# Patient Record
Sex: FEMALE | Race: WHITE | NOT HISPANIC OR LATINO | Employment: OTHER | URBAN - METROPOLITAN AREA
[De-identification: names, ages, dates, MRNs, and addresses within clinical notes are randomized per-mention and may not be internally consistent; named-entity substitution may affect disease eponyms.]

---

## 2017-01-04 ENCOUNTER — TRANSFERRED RECORDS (OUTPATIENT)
Dept: HEALTH INFORMATION MANAGEMENT | Facility: CLINIC | Age: 67
End: 2017-01-04

## 2017-03-17 DIAGNOSIS — E55.9 VITAMIN D DEFICIENCY: ICD-10-CM

## 2017-03-17 NOTE — TELEPHONE ENCOUNTER
Approved per pharmacist refill protocol. Patient is current on office visit and labs. Thanks.    Asia Davila, Pharm.D  Pharmacist in Charge  [facility]

## 2017-03-17 NOTE — TELEPHONE ENCOUNTER
Vitamin D3 1000unit      Last Written Prescription Date: 5-27-16  Last Fill Quantity: 90,  # refills: 1   Last Office Visit with G, [facility] or [facility] prescribing provider: 10-3-2016

## 2017-06-20 DIAGNOSIS — E55.9 VITAMIN D DEFICIENCY: ICD-10-CM

## 2017-06-20 NOTE — TELEPHONE ENCOUNTER
cholecalciferol (VITAMIN D) 1000 UNIT tablet      Last Written Prescription Date: 3/17/17  Last Fill Quantity: 90,  # refills: 0   Last Office Visit with [facility], [facility] or [facility] prescribing provider: 10/31/16

## 2017-06-23 RX ORDER — CHOLECALCIFEROL (VITAMIN D3) 25 MCG
TABLET ORAL
Qty: 90 TABLET | Refills: 1 | Status: SHIPPED | OUTPATIENT
Start: 2017-06-23 | End: 2018-01-15

## 2017-06-23 NOTE — TELEPHONE ENCOUNTER
Prescription approved per [facility] Refill Protocol.  Due for an annual hypertension check in July.   Please call patient to schedule this.   Cindy Fung, LINDSAY  Triage Nurse

## 2017-07-17 ENCOUNTER — OFFICE VISIT (OUTPATIENT)
Dept: FAMILY MEDICINE | Facility: CLINIC | Age: 67
End: 2017-07-17
Payer: COMMERCIAL

## 2017-07-17 VITALS
OXYGEN SATURATION: 98 % | BODY MASS INDEX: 42.27 KG/M2 | RESPIRATION RATE: 18 BRPM | HEIGHT: 65 IN | HEART RATE: 65 BPM | DIASTOLIC BLOOD PRESSURE: 62 MMHG | WEIGHT: 253.7 LBS | TEMPERATURE: 98.4 F | SYSTOLIC BLOOD PRESSURE: 130 MMHG

## 2017-07-17 DIAGNOSIS — Z11.59 ENCOUNTER FOR HEPATITIS C SCREENING TEST FOR LOW RISK PATIENT: ICD-10-CM

## 2017-07-17 DIAGNOSIS — K51.80 OTHER ULCERATIVE COLITIS WITHOUT COMPLICATION (H): ICD-10-CM

## 2017-07-17 DIAGNOSIS — E66.01 MORBID OBESITY, UNSPECIFIED OBESITY TYPE (H): ICD-10-CM

## 2017-07-17 DIAGNOSIS — Z71.89 ADVANCED DIRECTIVES, COUNSELING/DISCUSSION: ICD-10-CM

## 2017-07-17 DIAGNOSIS — Z91.89 PNEUMOCOCCAL VACCINATION INDICATED: ICD-10-CM

## 2017-07-17 DIAGNOSIS — Z79.890 POSTMENOPAUSAL HRT (HORMONE REPLACEMENT THERAPY): ICD-10-CM

## 2017-07-17 DIAGNOSIS — I10 ESSENTIAL HYPERTENSION WITH GOAL BLOOD PRESSURE LESS THAN 140/90: ICD-10-CM

## 2017-07-17 DIAGNOSIS — E78.5 HYPERLIPIDEMIA LDL GOAL <160: ICD-10-CM

## 2017-07-17 DIAGNOSIS — L83: ICD-10-CM

## 2017-07-17 DIAGNOSIS — Z23 NEED FOR TDAP VACCINATION: ICD-10-CM

## 2017-07-17 DIAGNOSIS — Z00.00 ROUTINE GENERAL MEDICAL EXAMINATION AT A HEALTH CARE FACILITY: Primary | ICD-10-CM

## 2017-07-17 PROCEDURE — 80061 LIPID PANEL: CPT | Performed by: INTERNAL MEDICINE

## 2017-07-17 PROCEDURE — 82043 UR ALBUMIN QUANTITATIVE: CPT | Performed by: INTERNAL MEDICINE

## 2017-07-17 PROCEDURE — 80053 COMPREHEN METABOLIC PANEL: CPT | Performed by: INTERNAL MEDICINE

## 2017-07-17 PROCEDURE — 90471 IMMUNIZATION ADMIN: CPT | Performed by: INTERNAL MEDICINE

## 2017-07-17 PROCEDURE — 86803 HEPATITIS C AB TEST: CPT | Performed by: INTERNAL MEDICINE

## 2017-07-17 PROCEDURE — G0009 ADMIN PNEUMOCOCCAL VACCINE: HCPCS | Mod: 59 | Performed by: INTERNAL MEDICINE

## 2017-07-17 PROCEDURE — 99213 OFFICE O/P EST LOW 20 MIN: CPT | Mod: 25 | Performed by: INTERNAL MEDICINE

## 2017-07-17 PROCEDURE — 90732 PPSV23 VACC 2 YRS+ SUBQ/IM: CPT | Performed by: INTERNAL MEDICINE

## 2017-07-17 PROCEDURE — 36415 COLL VENOUS BLD VENIPUNCTURE: CPT | Performed by: INTERNAL MEDICINE

## 2017-07-17 PROCEDURE — 99397 PER PM REEVAL EST PAT 65+ YR: CPT | Mod: 25 | Performed by: INTERNAL MEDICINE

## 2017-07-17 PROCEDURE — 90715 TDAP VACCINE 7 YRS/> IM: CPT | Performed by: INTERNAL MEDICINE

## 2017-07-17 RX ORDER — VALSARTAN AND HYDROCHLOROTHIAZIDE 160; 12.5 MG/1; MG/1
1 TABLET, FILM COATED ORAL DAILY
Qty: 90 TABLET | Refills: 3 | Status: SHIPPED | OUTPATIENT
Start: 2017-07-17 | End: 2018-07-31 | Stop reason: ALTCHOICE

## 2017-07-17 NOTE — MR AVS SNAPSHOT
After Visit Summary   7/17/2017    Jaylyn Gonzalez    MRN: [id number]           Patient Information     Date Of Birth          1950        Visit Information        Provider Department      7/17/2017 10:30 AM Luz Elena Paul MD [facility]        Today's Diagnoses     Routine general medical examination at a health care facility    -  1    Essential hypertension with goal blood pressure less than 140/90        Morbid obesity, unspecified obesity type (H)        Keratosis nigricans        Advanced directives, counseling/discussion        Other ulcerative colitis without complication (H)        Pneumococcal vaccination indicated        Need for Tdap vaccination        Encounter for hepatitis C screening test for low risk patient        Hyperlipidemia LDL goal <160          Care Instructions      Preventive Health Recommendations    Female Ages 65 +    Yearly exam:     See your health care provider every year in order to  o Review health changes.   o Discuss preventive care.    o Review your medicines if your doctor has prescribed any.      You no longer need a yearly Pap test unless you've had an abnormal Pap test in the past 10 years. If you have vaginal symptoms, such as bleeding or discharge, be sure to talk with your provider about a Pap test.      Every 1 to 2 years, have a mammogram.  If you are over 69, talk with your health care provider about whether or not you want to continue having screening mammograms.      Every 10 years, have a colonoscopy. Or, have a yearly FIT test (stool test). These exams will check for colon cancer.       Have a cholesterol test every 5 years, or more often if your doctor advises it.       Have a diabetes test (fasting glucose) every three years. If you are at risk for diabetes, you should have this test more often.       At age 65, have a bone density scan (DEXA) to check for osteoporosis (brittle bone disease).    Shots:    Get a flu shot  each year.    Get a tetanus shot every 10 years.    Talk to your doctor about your pneumonia vaccines. There are now two you should receive - Pneumovax (PPSV 23) and Prevnar (PCV 13).    Talk to your doctor about the shingles vaccine.    Talk to your doctor about the hepatitis B vaccine.    Nutrition:     Eat at least 5 servings of fruits and vegetables each day.      Eat whole-grain bread, whole-wheat pasta and brown rice instead of white grains and rice.      Talk to your provider about Calcium and Vitamin D.     Lifestyle    Exercise at least 150 minutes a week (30 minutes a day, 5 days a week). This will help you control your weight and prevent disease.      Limit alcohol to one drink per day.      No smoking.       Wear sunscreen to prevent skin cancer.       See your dentist twice a year for an exam and cleaning.      See your eye doctor every 1 to 2 years to screen for conditions such as glaucoma, macular degeneration and cataracts.          Follow-ups after your visit        Additional Services     DERMATOLOGY REFERRAL       Your provider has referred you to: [facility] [phone]   [email or web address]    Please be aware that coverage of these services is subject to the terms and limitations of your health insurance plan.  Call member services at your health plan with any benefit or coverage questions.      Please bring the following with you to your appointment:    (1) Any X-Rays, CTs or MRIs which have been performed.  Contact the facility where they were done to arrange for  prior to your scheduled appointment.    (2) List of current medications  (3) This referral request   (4) Any documents/labs given to you for this referral                  Who to contact     If you have questions or need follow up information about today's clinic visit or your schedule please contact [facility] directly at [phone].  Normal or non-critical lab and  "imaging results will be communicated to you by MyChart, letter or phone within 4 business days after the clinic has received the results. If you do not hear from us within 7 days, please contact the clinic through [employer] or phone. If you have a critical or abnormal lab result, we will notify you by phone as soon as possible.  Submit refill requests through [employer] or call your pharmacy and they will forward the refill request to us. Please allow 3 business days for your refill to be completed.          Additional Information About Your Visit        [employer] Information     [employer] lets you send messages to your doctor, view your test results, renew your prescriptions, schedule appointments and more. To sign up, go to [email or web address] . Click on \"Log in\" on the left side of the screen, which will take you to the Welcome page. Then click on \"Sign up Now\" on the right side of the page.     You will be asked to enter the access code listed below, as well as some personal information. Please follow the directions to create your username and password.     Your access code is: [email or web address]  Expires: 10/15/2017 11:29 AM     Your access code will  in 90 days. If you need help or a new code, please call your [facility] clinic or [phone].        Care EveryWhere ID     This is your Care EveryWhere ID. This could be used by other organizations to access your [facility] medical records  [id number]        Your Vitals Were     Pulse Temperature Respirations Height Pulse Oximetry BMI (Body Mass Index)    65 98.4  F (36.9  C) (Oral) 18 5' 5\" (1.651 m) 98% 42.22 kg/m2       Blood Pressure from Last 3 Encounters:   17 130/62   10/03/16 126/82   16 132/76    Weight from Last 3 Encounters:   17 253 lb 11.2 oz (115.1 kg)   10/03/16 251 lb 3.2 oz (113.9 kg)   16 250 lb 14.4 oz (113.8 kg)              We Performed the Following     ADMIN MEDICARE: Pneumococcal Vaccine ()     Albumin Random Urine " Quantitative     Comprehensive metabolic panel     DERMATOLOGY REFERRAL     Hepatitis C antibody     Lipid panel reflex to direct LDL     Pneumococcal vaccine 23 valent PPSV23  (Pneumovax) [21451]     TDAP VACCINE (ADACEL)          Today's Medication Changes          These changes are accurate as of: 7/17/17 11:29 AM.  If you have any questions, ask your nurse or doctor.               These medicines have changed or have updated prescriptions.        Dose/Directions    valsartan-hydrochlorothiazide 160-12.5 MG per tablet   Commonly known as:  DIOVAN-HCT   This may have changed:  additional instructions   Used for:  Essential hypertension with goal blood pressure less than 140/90   Changed by:  Luz Elena Paul MD        Dose:  1 tablet   Take 1 tablet by mouth daily   Quantity:  90 tablet   Refills:  3            Where to get your medicines      These medications were sent to [facility] [address]  [address] [facility] [address]     Phone:  [phone]     valsartan-hydrochlorothiazide 160-12.5 MG per tablet                Primary Care Provider Office Phone # Fax #    Luz Elena Paul [phone]       [facility] [address] [facility] [address]        Equal Access to Services     VIN LERMA AH: Hadii aliza ku hadasho Soomaali, waaxda luqadaha, qaybta kaalmada adeegyada, waxreid benitesin haylucrecian carlos gifford. So [facility] [phone].    ATENCIÓN: Si habla español, tiene a york disposición servicios gratuitos de asistencia lingüística. Llame al [phone].    We comply with applicable federal civil rights laws and [place] laws. We do not discriminate on the basis of race, color, national origin, age, disability sex, sexual orientation or gender identity.            Thank you!     Thank you for choosing [facility]  for your care. Our goal is always to provide you with excellent care. Hearing back from  our patients is one way we can continue to improve our services. Please take a few minutes to complete the written survey that you may receive in the mail after your visit with us. Thank you!             Your Updated Medication List - Protect others around you: Learn how to safely use, store and throw away your medicines at www.disposemymeds.org.          This list is accurate as of: 7/17/17 11:29 AM.  Always use your most recent med list.                   Brand Name Dispense Instructions for use Diagnosis    CALCIUM 500 PO      Take  by mouth.        cholecalciferol 1000 UNIT tablet    vitamin D    90 tablet    TAKE ONE TABLET BY MOUTH EVERY DAY    Vitamin D deficiency       mesalamine 400 MG EC tablet    ASACOL    180 tablet    Take 2 tablets (800 mg) by mouth daily as needed , pt usually is taking 1 -  800 mg per day    UC (ulcerative colitis) (H)       valsartan-hydrochlorothiazide 160-12.5 MG per tablet    DIOVAN-HCT    90 tablet    Take 1 tablet by mouth daily    Essential hypertension with goal blood pressure less than 140/90

## 2017-07-17 NOTE — NURSING NOTE
"Chief Complaint   Patient presents with     Physical     pt fasting     Hypertension     Imm/Inj     due for TDAP    PCV 23     Derm Problem       Initial /62 (BP Location: Right arm, Patient Position: Chair, Cuff Size: Adult Large)  Pulse 65  Temp 98.4  F (36.9  C) (Oral)  Resp 18  Ht 5' 5\" (1.651 m)  Wt 253 lb 11.2 oz (115.1 kg)  SpO2 98%  BMI 42.22 kg/m2 Estimated body mass index is 42.22 kg/(m^2) as calculated from the following:    Height as of this encounter: 5' 5\" (1.651 m).    Weight as of this encounter: 253 lb 11.2 oz (115.1 kg).  Medication Reconciliation: complete    "

## 2017-07-17 NOTE — PROGRESS NOTES
SUBJECTIVE:   Jaylyn Gonzalez is a 66 year old female who presents for Preventive Visit.      Are you in the first 12 months of your Medicare Part B coverage?  No    Healthy Habits:  Answers for HPI/ROS submitted by the patient on 7/17/2017   Annual Exam:  Getting at least 3 servings of Calcium per day:: Yes  Bi-annual eye exam:: NO  Dental care twice a year:: Yes  Sleep apnea or symptoms of sleep apnea:: Daytime drowsiness  Frequency of exercise:: 6-7 days/week  Taking medications regularly:: Yes  PHQ-2 Score: 0  Duration of exercise:: 30-45 minutes      COGNITIVE SCREEN  1) Repeat 3 items (Banana, Sunrise, Chair)    2) Clock draw: NORMAL  3) 3 item recall: Recalls 3 objects  Results: 3 items recalled: COGNITIVE IMPAIRMENT LESS LIKELY    Mini-CogTM Copyright S Chel. Licensed by the author for use in [facility] [employer]; reprinted with permission ([email or web address]@[email or web address]). All rights reserved.      Derm concern  Pt has pigmented patches of skin that feel very dry and itchy.  She has had some treated with liquid nitrogen in the past and that has helped.  She has noted one area on her upper left breast and skin tags on her neck which she would like to have checked to ensure that they are okay.    Hypertension  Pt has a history of hypertension and is currently taking valsartan hydrochlorothiazide.  She feels this medication is effective and is not experiencing any side effects.  Patient is not requesting any changes at this time.  Her blood pressure is well maintained and within range.    BP Readings from Last 3 Encounters:   07/17/17 130/62   10/03/16 126/82   07/28/16 132/76     Reviewed and updated as needed this visit by clinical staff  Tobacco  Allergies  Meds  Med Hx  Surg Hx  Fam Hx  Soc Hx      Reviewed and updated as needed this visit by Provider        Social History   Substance Use Topics     Smoking status: Never Smoker     Smokeless tobacco: Never Used     Alcohol use Yes      Comment: occ       The  patient does not drink >3 drinks per day nor >7 drinks per week.    Today's PHQ-2 Score:   PHQ-2 ( 1999 Pfizer) 7/17/2017 7/28/2016   Q1: Little interest or pleasure in doing things 0 0   Q2: Feeling down, depressed or hopeless 0 0   PHQ-2 Score 0 0   Q1: Little interest or pleasure in doing things Not at all -   Q2: Feeling down, depressed or hopeless Not at all -   PHQ-2 Score 0 -     Do you feel safe in your environment - Yes    Do you have a Health Care Directive?: No: Advance care planning was reviewed with patient; patient declined at this time.    Current providers sharing in care for this patient include:   Patient Care Team:  Luz Elena Paul MD as PCP - General (Internal Medicine)      Hearing impairment: No    Ability to successfully perform activities of daily living: Yes, no assistance needed     Fall risk:  Fallen 2 or more times in the past year?: No  Any fall with injury in the past year?: No    Home safety:  none identified      The following health maintenance items are reviewed in Epic and correct as of today:  Health Maintenance   Topic Date Due     HEPATITIS C SCREENING  10/15/1968     PNEUMOCOCCAL (2 of 2 - PPSV23) 12/01/2015     TETANUS IMMUNIZATION (SYSTEM ASSIGNED)  05/01/2017     FALL RISK ASSESSMENT  07/28/2017     MAMMO SCREEN Q2 YR (SYSTEM ASSIGNED)  11/07/2018     DEXA Q3 YR  07/17/2020     LIPID MONITORING Q5 YEARS  07/29/2021     ADVANCE DIRECTIVE PLANNING Q5 YRS  07/17/2022     COLON CANCER SCREEN (SYSTEM ASSIGNED)  04/18/2026     Labs reviewed in [facility]    REVIEW OF SYSTEMS:  C: NEGATIVE for fever, chills, or change in weight  I: Positive for skin tags on neck and area on breast; NEGATIVE for worrisome rashes, moles or lesions  E/M: NEGATIVE for ear, nose, mouth, or throat problems  R: NEGATIVE for cough or shortness of breath  CV: NEGATIVE for chest pain, palpitations or peripheral edema  GI: Positive for soft bloody stool, NEGATIVE for nausea, abdominal pain, heartburn, or  "change in bowel habits  : Hx of fallen bladder - causes urinary frequency; NEGATIVE for unusual vaginal symptoms;   P: NEGATIVE for changes in mood or affect    This document serves as a record of the services and decisions personally performed and made by Luz Elena Paul MD. It was created on her behalf by Manuel Hermosillo, a trained medical scribe. The creation of this document is based the provider's statements to the medical scribe.  Manuel Hermosillo, July 17, 2017 11:18 AM    OBJECTIVE:   /62 (BP Location: Right arm, Patient Position: Chair, Cuff Size: Adult Large)  Pulse 65  Temp 98.4  F (36.9  C) (Oral)  Resp 18  Ht 1.651 m (5' 5\")  Wt 115.1 kg (253 lb 11.2 oz)  SpO2 98%  BMI 42.22 kg/m2 Estimated body mass index is 42.22 kg/(m^2) as calculated from the following:    Height as of this encounter: 1.651 m (5' 5\").    Weight as of this encounter: 115.1 kg (253 lb 11.2 oz).    EXAM:  GENERAL: Patient appears healthy, alert and not distressed.  HENT: Ear canals and TM's appear normal and mouth without ulcers or lesions, oropharynx is clear and oral mucous membranes are moist  NECK: No adenopathy present, no asymmetry, masses, or scars noted, thyroid is normal to palpation  RESP: Lungs are clear to auscultation - no rales, rhonchi or wheezes present  CV: Regular rate and rhythm, normal S1 S2 heart sounds, no ectopy, no peripheral edema present, peripheral pulses normal, no carotid bruit.  ABDOMEN: Soft, nontender, no hepatosplenomegaly, no masses, normal bowel sounds  BREAST: Normal without masses, tenderness or nipple discharge and no palpable axillary masses or adenopathy  MS: No gross musculoskeletal defects noted, no edema, gait is age appropriate without ataxia, tandem walk off - forced to catch herself;   SKIN: Keratoses in hairline and on face; No suspicious rashes, lesions, or moles.  NEURO: Patient exhibits normal strength and tone, normal speech and mentation  PSYCH: Mentation appears normal, " affect is normal/bright    Diagnostic Test Results:  No results found for this or any previous visit (from the past 24 hour(s)).     ASSESSMENT / PLAN:   (Z00.00) Routine general medical examination at a health care facility  (primary encounter diagnosis)  Comment: Pt was due for a routine physical  Plan: reassess in one year    (I10) Essential hypertension with goal blood pressure less than 140/90  Comment: Pt has a history of hypertension and is currently taking valsartan-hydrochlorothiazide.  meds are well tolerated; diuretic monitoring- Cr and Potassium  Plan: valsartan-hydrochlorothiazide (DIOVAN-HCT)         160-12.5 MG per tablet, Comprehensive metabolic        panel, Lipid panel reflex to direct LDL,         Albumin Random Urine Quantitative          (E66.01) Morbid obesity, unspecified obesity type (H)  Comment: Pt has a BMI 42.31, is morbidly obese.  Plan: encourage diet and exercise     (L83) Keratosis nigricans  Comment: multiple- face, scalp; interested in treatment ; to see Dermatologist.  Plan: DERMATOLOGY REFERRAL          (Z71.89) Advanced directives, counseling/discussion  Comment: Pt does not have an advanced directive on file, was given instructions on how to set it up  Plan: Pt will set up an advanced directive and get back to the clinic     (K51.80) Other ulcerative colitis without complication (H)  Comment: Followed by [facility]- On Asacol  Plan: Continue to follow [facility]; currently well contorlled    (Z91.89) Pneumococcal vaccination indicated  Comment: Pt due for a pneumococcal vaccine  Plan: Pneumococcal vaccine 23 valent PPSV23          (Pneumovax) [65026], ADMIN MEDICARE:         Pneumococcal Vaccine ()          (Z23) Need for Tdap vaccination  Comment: Pt due for a TDAP vaccination  Plan: TDAP VACCINE (ADACEL)          (Z11.59) Encounter for hepatitis C screening test for low risk patient  Comment: The patient was born between 1946 and 1966,Hep C screening is recommended .   Plan:  "Hepatitis C antibody          (E78.5) Hyperlipidemia LDL goal <160  Comment: Pt has a history of hyperlipidemia and is currently taking no statin.  Her cholesterol is elevated but prefers to manage with diet and exercise.    Recent Labs   Lab Test  07/29/16   0908  12/01/14   1211  11/04/13   1234   CHOL  250*  260*  219*   HDL  55  76  61   LDL  162*  168*  141*   TRIG  165*  79  85   CHOLHDLRATIO   --   3.4  3.6   Plan: Comprehensive metabolic panel, Lipid panel         reflex to direct LDL, continue to monitor diet and exercise          End of Life Planning:  Patient currently has an advanced directive: No.  I have verified the patient's ablity to prepare an advanced directive/make health care decisions.  Literature was provided to assist patient in preparing an advanced directive.    COUNSELING:  Reviewed preventive health counseling, as reflected in patient instructions  Special attention given to:       Regular exercise       Healthy diet/nutrition    Estimated body mass index is 42.22 kg/(m^2) as calculated from the following:    Height as of this encounter: 1.651 m (5' 5\").    Weight as of this encounter: 115.1 kg (253 lb 11.2 oz).  Weight management plan: Discussed healthy diet and exercise guidelines and patient will follow up in 12 months in clinic to re-evaluate.   reports that she has never smoked. She has never used smokeless tobacco.      Appropriate preventive services were discussed with this patient, including applicable screening as appropriate for cardiovascular disease, diabetes, osteopenia/osteoporosis, and glaucoma.  As appropriate for age/gender, discussed screening for colorectal cancer, prostate cancer, breast cancer, and cervical cancer. Checklist reviewing preventive services available has been given to the patient.    Reviewed patients plan of care and provided an AVS. The Basic Care Plan (routine screening as documented in Health Maintenance) for Jaylyn meets the Care Plan requirement. " This Care Plan has been established and reviewed with the Patient.    Counseling Resources:  ATP IV Guidelines  Pooled Cohorts Equation Calculator  Breast Cancer Risk Calculator  FRAX Risk Assessment  ICSI Preventive Guidelines  Dietary Guidelines for Americans, 2010  USDA's MyPlate  ASA Prophylaxis  Lung CA Screening    The information in this document, created by a medical scribe for me, accurately reflects the services I personally performed and the decisions made by me. I have reviewed and approved this document for accuracy.  Dr. Luz Elena Paul, July 17, 2017, 11:40 PM    Luz Elena Paul MD  Internal Medicine  [facility] [place]  15 minutes in addition to HEALTH CARE MAINTENANCE are spent with patient, over 50% of that time spent providing counselling, discussing and reviewing medical conditions/concerns, meds and potential side effects.

## 2017-07-18 LAB
ALBUMIN SERPL-MCNC: 3.4 G/DL (ref 3.4–5)
ALP SERPL-CCNC: 75 U/L (ref 40–150)
ALT SERPL W P-5'-P-CCNC: 18 U/L (ref 0–50)
ANION GAP SERPL CALCULATED.3IONS-SCNC: 8 MMOL/L (ref 3–14)
AST SERPL W P-5'-P-CCNC: 14 U/L (ref 0–45)
BILIRUB SERPL-MCNC: 0.7 MG/DL (ref 0.2–1.3)
BUN SERPL-MCNC: 13 MG/DL (ref 7–30)
CALCIUM SERPL-MCNC: 9.1 MG/DL (ref 8.5–10.1)
CHLORIDE SERPL-SCNC: 107 MMOL/L (ref 94–109)
CHOLEST SERPL-MCNC: 241 MG/DL
CO2 SERPL-SCNC: 28 MMOL/L (ref 20–32)
CREAT SERPL-MCNC: 0.76 MG/DL (ref 0.52–1.04)
CREAT UR-MCNC: 323 MG/DL
GFR SERPL CREATININE-BSD FRML MDRD: 75 ML/MIN/1.7M2
GLUCOSE SERPL-MCNC: 97 MG/DL (ref 70–99)
HCV AB SERPL QL IA: NORMAL
HDLC SERPL-MCNC: 60 MG/DL
LDLC SERPL CALC-MCNC: 154 MG/DL
MICROALBUMIN UR-MCNC: 28 MG/L
MICROALBUMIN/CREAT UR: 8.76 MG/G CR (ref 0–25)
NONHDLC SERPL-MCNC: 181 MG/DL
POTASSIUM SERPL-SCNC: 3.6 MMOL/L (ref 3.4–5.3)
PROT SERPL-MCNC: 7.8 G/DL (ref 6.8–8.8)
SODIUM SERPL-SCNC: 143 MMOL/L (ref 133–144)
TRIGL SERPL-MCNC: 133 MG/DL

## 2017-08-04 PROBLEM — E66.01 MORBID OBESITY, UNSPECIFIED OBESITY TYPE (H): Status: ACTIVE | Noted: 2017-08-04

## 2018-01-10 ENCOUNTER — TRANSFERRED RECORDS (OUTPATIENT)
Dept: HEALTH INFORMATION MANAGEMENT | Facility: CLINIC | Age: 68
End: 2018-01-10

## 2018-01-15 DIAGNOSIS — E55.9 VITAMIN D DEFICIENCY: ICD-10-CM

## 2018-01-15 NOTE — TELEPHONE ENCOUNTER
"Requested Prescriptions   Pending Prescriptions Disp Refills     VITAMIN D3 1000 UNITS tablet [Pharmacy Med Name: VITAMIN D3 1000UNIT TABS]  Last Written Prescription Date:  6/23/17  Last Fill Quantity: 90,  # refills: 1   Last Office Visit with G, P or [facility] prescribing provider:  7/17/2017     Future Office Visit:      90 tablet 1     Sig: TAKE ONE TABLET BY MOUTH EVERY DAY    Vitamin Supplements (Adult) Protocol Passed    1/15/2018  1:37 PM       Passed - High dose Vitamin D not ordered       Passed - Recent or future visit with authorizing provider's specialty    Patient had office visit in the last year or has a visit in the next 30 days with authorizing provider.  See \"Patient Info\" tab in inbasket, or \"Choose Columns\" in Meds & Orders section of the refill encounter.                 "

## 2018-01-17 RX ORDER — CHOLECALCIFEROL (VITAMIN D3) 25 MCG
TABLET ORAL
Qty: 90 TABLET | Refills: 0 | Status: SHIPPED | OUTPATIENT
Start: 2018-01-17 | End: 2018-11-09

## 2018-01-17 NOTE — TELEPHONE ENCOUNTER
Medication is being filled for 1 time refill only due to:  Patient needs labs Vitamin D recheck-has been more than a year since last lab.  Call to schedule lab only appointment, future order placed    Valery Simon RN

## 2018-01-18 NOTE — TELEPHONE ENCOUNTER
Patient called back, scheduled lab for next week.  She is very upset that she did not have this done at  in July and she is also very upset that she never heard back on her lab results either.

## 2018-01-23 DIAGNOSIS — E55.9 VITAMIN D DEFICIENCY: ICD-10-CM

## 2018-01-23 PROCEDURE — 36415 COLL VENOUS BLD VENIPUNCTURE: CPT | Performed by: INTERNAL MEDICINE

## 2018-01-23 PROCEDURE — 82306 VITAMIN D 25 HYDROXY: CPT | Performed by: INTERNAL MEDICINE

## 2018-01-23 NOTE — LETTER
February 5, 2018      Jaylyn LAU [place]  [address] [id number]        Dear ,    We are writing to inform you of your test results.    Chart reviewed.  Last seen in clinic on 7/17/17. Vit D level OK, continue same level of Vit D supplementation.     Resulted Orders   Vitamin D Deficiency   Result Value Ref Range    Vitamin D Deficiency screening 26 20 - 75 ug/L      Comment:      Season, race, dietary intake, and treatment affect the concentration of   25-hydroxy-Vitamin D. Values may decrease during winter months and increase   during summer months. Values 20-29 ug/L may indicate Vitamin D insufficiency   and values <20 ug/L may indicate Vitamin D deficiency.  Vitamin D determination is routinely performed by an immunoassay specific for   25 hydroxyvitamin D3.  If an individual is on vitamin D2 (ergocalciferol)   supplementation, please specify 25 OH vitamin D2 and D3 level determination by   LCMSMS test VITD23.         If you have any questions or concerns, please call the clinic at the number listed above.       Sincerely,      Luz Elena Paul MD/óscar

## 2018-01-24 LAB — DEPRECATED CALCIDIOL+CALCIFEROL SERPL-MC: 26 UG/L (ref 20–75)

## 2018-02-05 NOTE — PROGRESS NOTES
Chart reviewed.  Last seen in clinic on 7/17/17. Vit D level OK, continue same level of Vit D supplementation.

## 2018-04-16 ENCOUNTER — TRANSFERRED RECORDS (OUTPATIENT)
Dept: HEALTH INFORMATION MANAGEMENT | Facility: CLINIC | Age: 68
End: 2018-04-16

## 2018-04-20 ENCOUNTER — TRANSFERRED RECORDS (OUTPATIENT)
Dept: HEALTH INFORMATION MANAGEMENT | Facility: CLINIC | Age: 68
End: 2018-04-20

## 2018-07-31 ENCOUNTER — TELEPHONE (OUTPATIENT)
Dept: FAMILY MEDICINE | Facility: CLINIC | Age: 68
End: 2018-07-31

## 2018-07-31 DIAGNOSIS — I10 HTN (HYPERTENSION): Primary | ICD-10-CM

## 2018-07-31 RX ORDER — HYDROCHLOROTHIAZIDE 12.5 MG/1
12.5 TABLET ORAL DAILY
Qty: 30 TABLET | Refills: 0 | Status: SHIPPED | OUTPATIENT
Start: 2018-07-31 | End: 2018-08-09

## 2018-07-31 RX ORDER — IRBESARTAN 150 MG/1
150 TABLET ORAL DAILY
Qty: 30 TABLET | Refills: 0 | Status: SHIPPED | OUTPATIENT
Start: 2018-07-31 | End: 2018-08-09

## 2018-07-31 NOTE — TELEPHONE ENCOUNTER
Patient is calling about Valsartan.  Said she needs a substitute for the Valsartan that covers her diuretic as well.     Please place lab orders for annual phsyical.  Patient scheduled physical and has a pre-lab on 8/2.        You can call patient to discuss at [phone] (home).

## 2018-07-31 NOTE — TELEPHONE ENCOUNTER
Spoke to Abisai at [facility] pharmacy.  He can't get the combo in right now nor can any  pharmacy.      Called the pt to see what pharmacy she was looking for.  She was looking for  [facility].  Advised that the med needs to be ordered separately.  Med ordered.      Ordered cmp and lipids.  Will double check to see if she would like a microalbumin as well.

## 2018-08-02 DIAGNOSIS — I10 HTN (HYPERTENSION): ICD-10-CM

## 2018-08-02 LAB
ALBUMIN SERPL-MCNC: 3.4 G/DL (ref 3.4–5)
ALP SERPL-CCNC: 71 U/L (ref 40–150)
ALT SERPL W P-5'-P-CCNC: 16 U/L (ref 0–50)
ANION GAP SERPL CALCULATED.3IONS-SCNC: 9 MMOL/L (ref 3–14)
AST SERPL W P-5'-P-CCNC: 11 U/L (ref 0–45)
BILIRUB SERPL-MCNC: 0.5 MG/DL (ref 0.2–1.3)
BUN SERPL-MCNC: 14 MG/DL (ref 7–30)
CALCIUM SERPL-MCNC: 8.9 MG/DL (ref 8.5–10.1)
CHLORIDE SERPL-SCNC: 107 MMOL/L (ref 94–109)
CHOLEST SERPL-MCNC: 229 MG/DL
CO2 SERPL-SCNC: 24 MMOL/L (ref 20–32)
CREAT SERPL-MCNC: 0.76 MG/DL (ref 0.52–1.04)
CREAT UR-MCNC: 111 MG/DL
GFR SERPL CREATININE-BSD FRML MDRD: 75 ML/MIN/1.7M2
GLUCOSE SERPL-MCNC: 96 MG/DL (ref 70–99)
HDLC SERPL-MCNC: 64 MG/DL
LDLC SERPL CALC-MCNC: 148 MG/DL
MICROALBUMIN UR-MCNC: 18 MG/L
MICROALBUMIN/CREAT UR: 15.86 MG/G CR (ref 0–25)
NONHDLC SERPL-MCNC: 165 MG/DL
POTASSIUM SERPL-SCNC: 4 MMOL/L (ref 3.4–5.3)
PROT SERPL-MCNC: 7.4 G/DL (ref 6.8–8.8)
SODIUM SERPL-SCNC: 140 MMOL/L (ref 133–144)
TRIGL SERPL-MCNC: 85 MG/DL

## 2018-08-02 PROCEDURE — 80061 LIPID PANEL: CPT | Performed by: INTERNAL MEDICINE

## 2018-08-02 PROCEDURE — 80053 COMPREHEN METABOLIC PANEL: CPT | Performed by: INTERNAL MEDICINE

## 2018-08-02 PROCEDURE — 82043 UR ALBUMIN QUANTITATIVE: CPT | Performed by: INTERNAL MEDICINE

## 2018-08-02 PROCEDURE — 36415 COLL VENOUS BLD VENIPUNCTURE: CPT | Performed by: INTERNAL MEDICINE

## 2018-08-09 ENCOUNTER — OFFICE VISIT (OUTPATIENT)
Dept: FAMILY MEDICINE | Facility: CLINIC | Age: 68
End: 2018-08-09
Payer: COMMERCIAL

## 2018-08-09 VITALS
RESPIRATION RATE: 18 BRPM | TEMPERATURE: 98 F | BODY MASS INDEX: 42.65 KG/M2 | HEART RATE: 114 BPM | WEIGHT: 256 LBS | DIASTOLIC BLOOD PRESSURE: 76 MMHG | OXYGEN SATURATION: 96 % | SYSTOLIC BLOOD PRESSURE: 118 MMHG | HEIGHT: 65 IN

## 2018-08-09 DIAGNOSIS — K51.00 ULCERATIVE PANCOLITIS WITHOUT COMPLICATION (H): ICD-10-CM

## 2018-08-09 DIAGNOSIS — I10 ESSENTIAL HYPERTENSION: ICD-10-CM

## 2018-08-09 DIAGNOSIS — E66.01 MORBID OBESITY, UNSPECIFIED OBESITY TYPE (H): ICD-10-CM

## 2018-08-09 DIAGNOSIS — Z00.00 ROUTINE HISTORY AND PHYSICAL EXAMINATION OF ADULT: Primary | ICD-10-CM

## 2018-08-09 DIAGNOSIS — Z12.31 VISIT FOR SCREENING MAMMOGRAM: ICD-10-CM

## 2018-08-09 DIAGNOSIS — L82.1 SEBORRHEIC KERATOSIS: ICD-10-CM

## 2018-08-09 DIAGNOSIS — E78.5 HYPERLIPIDEMIA LDL GOAL <160: ICD-10-CM

## 2018-08-09 PROCEDURE — 99214 OFFICE O/P EST MOD 30 MIN: CPT | Mod: 25 | Performed by: INTERNAL MEDICINE

## 2018-08-09 PROCEDURE — 99397 PER PM REEVAL EST PAT 65+ YR: CPT | Performed by: INTERNAL MEDICINE

## 2018-08-09 RX ORDER — HYDROCHLOROTHIAZIDE 12.5 MG/1
12.5 TABLET ORAL DAILY
Qty: 30 TABLET | Refills: 11 | Status: SHIPPED | OUTPATIENT
Start: 2018-08-09 | End: 2019-08-30

## 2018-08-09 RX ORDER — IRBESARTAN 150 MG/1
150 TABLET ORAL DAILY
Qty: 30 TABLET | Refills: 11 | Status: SHIPPED | OUTPATIENT
Start: 2018-08-09 | End: 2019-08-30

## 2018-08-09 ASSESSMENT — ENCOUNTER SYMPTOMS
WEAKNESS: 0
SORE THROAT: 0
MYALGIAS: 0
CHILLS: 0
BREAST MASS: 0
DIZZINESS: 0
HEARTBURN: 0
NAUSEA: 0
EYE PAIN: 0
COUGH: 0
ABDOMINAL PAIN: 0
ARTHRALGIAS: 0
JOINT SWELLING: 0
DIARRHEA: 0
FREQUENCY: 0
DYSURIA: 0
CONSTIPATION: 0
HEMATOCHEZIA: 0
HEADACHES: 0
HEMATURIA: 0
SHORTNESS OF BREATH: 0

## 2018-08-09 ASSESSMENT — ACTIVITIES OF DAILY LIVING (ADL)
CURRENT_FUNCTION: NO ASSISTANCE NEEDED
I_NEED_ASSISTANCE_FOR_THE_FOLLOWING_DAILY_ACTIVITIES:: NO ASSISTANCE IS NEEDED

## 2018-08-09 NOTE — PATIENT INSTRUCTIONS

## 2018-08-09 NOTE — MR AVS SNAPSHOT
After Visit Summary   8/9/2018    Jaylyn Gonzalez    MRN: [id number]           Patient Information     Date Of Birth          1950        Visit Information        Provider Department      8/9/2018 1:45 PM Luz Elena Paul MD [facility]        Today's Diagnoses     Routine history and physical examination of adult    -  1    Essential hypertension        Visit for screening mammogram        Hyperlipidemia LDL goal <160        Morbid obesity, unspecified obesity type (H) BMI>42        Ulcerative pancolitis without complication (H)        Seborrheic keratosis          Care Instructions      Preventive Health Recommendations    Female Ages 65 +    Yearly exam:     See your health care provider every year in order to  o Review health changes.   o Discuss preventive care.    o Review your medicines if your doctor has prescribed any.      You no longer need a yearly Pap test unless you've had an abnormal Pap test in the past 10 years. If you have vaginal symptoms, such as bleeding or discharge, be sure to talk with your provider about a Pap test.      Every 1 to 2 years, have a mammogram.  If you are over 69, talk with your health care provider about whether or not you want to continue having screening mammograms.      Every 10 years, have a colonoscopy. Or, have a yearly FIT test (stool test). These exams will check for colon cancer.       Have a cholesterol test every 5 years, or more often if your doctor advises it.       Have a diabetes test (fasting glucose) every three years. If you are at risk for diabetes, you should have this test more often.       At age 65, have a bone density scan (DEXA) to check for osteoporosis (brittle bone disease).    Shots:    Get a flu shot each year.    Get a tetanus shot every 10 years.    Talk to your doctor about your pneumonia vaccines. There are now two you should receive - Pneumovax (PPSV 23) and Prevnar (PCV 13).    Talk to your pharmacist  about the shingles vaccine.    Talk to your doctor about the hepatitis B vaccine.    Nutrition:     Eat at least 5 servings of fruits and vegetables each day.      Eat whole-grain bread, whole-wheat pasta and brown rice instead of white grains and rice.      Get adequate Calcium and Vitamin D.     Lifestyle    Exercise at least 150 minutes a week (30 minutes a day, 5 days a week). This will help you control your weight and prevent disease.      Limit alcohol to one drink per day.      No smoking.       Wear sunscreen to prevent skin cancer.       See your dentist twice a year for an exam and cleaning.      See your eye doctor every 1 to 2 years to screen for conditions such as glaucoma, macular degeneration and cataracts.    YOU MAY CONTACT [facility] [place] [phone] TO SCHEDULE YOUR MAMMOGRAM AT YOUR CONVENIENCE.             Follow-ups after your visit        Future tests that were ordered for you today     Open Future Orders        Priority Expected Expires Ordered    MA Screening Digital Bilateral Routine  8/9/2019 8/9/2018            Who to contact     If you have questions or need follow up information about today's clinic visit or your schedule please contact [facility] directly at [phone].  Normal or non-critical lab and imaging results will be communicated to you by TripChamphart, letter or phone within 4 business days after the clinic has received the results. If you do not hear from us within 7 days, please contact the clinic through Broadcastrt or phone. If you have a critical or abnormal lab result, we will notify you by phone as soon as possible.  Submit refill requests through [email or web address] or call your pharmacy and they will forward the refill request to us. Please allow 3 business days for your refill to be completed.          Additional Information About Your Visit        TripChampharCervilenz Information     [email or web address] lets you send messages to your doctor, view your test results, renew your  "prescriptions, schedule appointments and more. To sign up, go to [email or web address] . Click on \"Log in\" on the left side of the screen, which will take you to the Welcome page. Then click on \"Sign up Now\" on the right side of the page.     You will be asked to enter the access code listed below, as well as some personal information. Please follow the directions to create your username and password.     Your access code is: [email or web address]  Expires: 2018  2:45 PM     Your access code will  in 90 days. If you need help or a new code, please call your [facility] clinic or [phone].        Care EveryWhere ID     This is your Care EveryWhere ID. This could be used by other organizations to access your [facility] medical records  [id number]        Your Vitals Were     Pulse Temperature Respirations Height Pulse Oximetry BMI (Body Mass Index)    114 98  F (36.7  C) (Oral) 18 5' 4.5\" (1.638 m) 96% 43.26 kg/m2       Blood Pressure from Last 3 Encounters:   18 118/76   17 130/62   10/03/16 126/82    Weight from Last 3 Encounters:   18 256 lb (116.1 kg)   17 253 lb 11.2 oz (115.1 kg)   10/03/16 251 lb 3.2 oz (113.9 kg)                 Where to get your medicines      These medications were [place] [facility] [address]  [address] [facility] [address]     Phone:  [phone]     hydrochlorothiazide 12.5 MG Tabs tablet    irbesartan 150 MG tablet          Primary Care Provider Office Phone # Fax #    Luz Elena Paul [phone] [address]  [facility] [address]        Equal Access to Services     [facility] : Sigifredo Zepeda, wagwen luqrola, qaybta kaalrenan salazar . [facility] [phone].    ATENCIÓN: Si habla español, tiene a york disposición servicios gratuitos de asistencia lingüística. Llame al [phone].    We comply with applicable " federal civil rights laws and [place] laws. We do not discriminate on the basis of race, color, national origin, age, disability, sex, sexual orientation, or gender identity.            Thank you!     Thank you for choosing [facility]  for your care. Our goal is always to provide you with excellent care. Hearing back from our patients is one way we can continue to improve our services. Please take a few minutes to complete the written survey that you may receive in the mail after your visit with us. Thank you!             Your Updated Medication List - Protect others around you: Learn how to safely use, store and throw away your medicines at www.disposemymeds.org.          This list is accurate as of 8/9/18  2:45 PM.  Always use your most recent med list.                   Brand Name Dispense Instructions for use Diagnosis    CALCIUM 500 PO      Take  by mouth.        cholecalciferol 1000 UNIT tablet    vitamin D3    90 tablet    TAKE ONE TABLET BY MOUTH EVERY DAY    Vitamin D deficiency       hydrochlorothiazide 12.5 MG Tabs tablet     30 tablet    Take 1 tablet (12.5 mg) by mouth daily    Essential hypertension       irbesartan 150 MG tablet    AVAPRO    30 tablet    Take 1 tablet (150 mg) by mouth daily    Essential hypertension       mesalamine 400 MG EC tablet    ASACOL    180 tablet    Take 2 tablets (800 mg) by mouth daily as needed , pt usually is taking 1 -  800 mg per day    UC (ulcerative colitis) (H)

## 2018-08-09 NOTE — PROGRESS NOTES
SUBJECTIVE:   Jaylyn Gonzalez is a 67 year old female who presents for Preventive Visit.    Are you in the first 12 months of your Medicare coverage?  No    Physical   Annual:     Getting at least 3 servings of Calcium per day:  Yes    Bi-annual eye exam:  NO    Dental care twice a year:  Yes    Sleep apnea or symptoms of sleep apnea:  Daytime drowsiness    Diet:  Regular (no restrictions)    Frequency of exercise:  4-5 days/week    Duration of exercise:  30-45 minutes    Taking medications regularly:  Yes    Medication side effects:  None    Additional concerns today:  No    Ability to successfully perform activities of daily living: no assistance needed    Home Safety:  No safety concerns identified    Hearing Impairment: no hearing concerns        Fall risk:  Fallen 2 or more times in the past year?: No  Any fall with injury in the past year?: No    COGNITIVE SCREEN  1) Repeat 3 items (Leader, Season, Table)    2) Clock draw: NORMAL  3) 3 item recall: Recalls 3 objects  Results: 3 items recalled: COGNITIVE IMPAIRMENT LESS LIKELY    Mini-CogTM Copyright S Chel. Licensed by the author for use in [facility]; reprinted with permission ([email or web address]@[facility]). All rights reserved.        Reviewed and updated as needed this visit by clinical staff  Tobacco  Allergies  Meds  Problems  Med Hx  Surg Hx  Fam Hx  Soc Hx          Reviewed and updated as needed this visit by Provider  Allergies  Meds  Problems        Social History   Substance Use Topics     Smoking status: Never Smoker     Smokeless tobacco: Never Used     Alcohol use Yes      Comment: occ       Alcohol Use 8/9/2018   If you drink alcohol do you typically have greater than 3 drinks per day OR greater than 7 drinks per week? Not Applicable           Hypertension Follow-up      Outpatient blood pressures are not being checked.    Low Salt Diet: no added salt    Lipids- no meds to date    Obesity- Body mass index is 43.26 kg/(m^2).   Increased  risk for DJD of knees, hips, etc.     Today's PHQ-2 Score:   PHQ-2 ( 1999 Pfizer) 8/9/2018   Q1: Little interest or pleasure in doing things 1   Q2: Feeling down, depressed or hopeless 1   PHQ-2 Score 2   Q1: Little interest or pleasure in doing things Several days   Q2: Feeling down, depressed or hopeless Several days   PHQ-2 Score 2       Do you feel safe in your environment - Yes    Do you have a Health Care Directive?: No: Advance care planning was reviewed with patient; patient declined at this time.    Current providers sharing in care for this patient include:   Patient Care Team:  Luz Elena Paul MD as PCP - General (Internal Medicine)    The following health maintenance items are reviewed in Epic and correct as of today:  Health Maintenance   Topic Date Due     FALL RISK ASSESSMENT  08/09/2019     PHQ-2 Q1 YR  08/09/2019     DEXA Q3 YR  07/17/2020     MAMMO SCREEN Q2 YR (SYSTEM ASSIGNED)  08/16/2020     ADVANCE DIRECTIVE PLANNING Q5 YRS  07/17/2022     LIPID MONITORING Q5 YEARS  08/02/2023     TETANUS IMMUNIZATION (SYSTEM ASSIGNED)  07/17/2027     COLON CANCER SCREEN (SYSTEM ASSIGNED)  04/16/2028     PNEUMOCOCCAL  Completed     HEPATITIS C SCREENING  Completed     Labs reviewed in EPIC  BP Readings from Last 3 Encounters:   08/09/18 118/76   07/17/17 130/62   10/03/16 126/82    Wt Readings from Last 3 Encounters:   08/09/18 256 lb (116.1 kg)   07/17/17 253 lb 11.2 oz (115.1 kg)   10/03/16 251 lb 3.2 oz (113.9 kg)                  Patient Active Problem List   Diagnosis     UC (ulcerative colitis) (H)     HTN (hypertension)     Hyperlipidemia LDL goal <160     Advanced directives, counseling/discussion     Seborrheic keratosis     Vitamin D deficiency     Hiatal hernia     Paraumbilical hernia     Snoring     Obesity     Fatigue     Cystocele     Morbid obesity, unspecified obesity type (H)     Past Surgical History:   Procedure Laterality Date     COLONOSCOPY  11/7/2011    Procedure:COMBINED  COLONOSCOPY, SINGLE BIOPSY/POLYPECTOMY BY BIOPSY; COLONOSCOPY  cold bx's; Surgeon:NOE DIAZ; Location:[facility]     ENDOSCOPIC RETROGRADE CHOLANGIOPANCREATOGRAM  2012    Procedure: ENDOSCOPIC RETROGRADE CHOLANGIOPANCREATOGRAM;  ENDOSCOPIC RETROGRADE CHOLANGIOPANCREATOGRAM ;  Surgeon: Noe Diaz MD;  Location: [facility] OR     GYN SURGERY       HYSTERECTOMY       hysterectomy total  1995    spot in uterus, cancerous, no follow up needed     LAPAROSCOPIC CHOLECYSTECTOMY  2012    Procedure: LAPAROSCOPIC CHOLECYSTECTOMY;  LAPAROSCOPIC CHOLECYSTECTOMY;  Surgeon: Viri Garcia MD;  Location: [facility] OR     LAPAROTOMY EXPLORATORY       MOUTH SURGERY         Social History   Substance Use Topics     Smoking status: Never Smoker     Smokeless tobacco: Never Used     Alcohol use Yes      Comment: occ     Family History   Problem Relation Age of Onset     Parkinsonism Mother       in her 80's ,dementia      Neurologic Disorder Mother      parkinson     Family History Negative Father       from sepsis after hip surgery      Hypertension Father      Psychotic Disorder Brother      1-in group home -MR-64 yo old      Heart Failure Maternal Grandmother      Prostate Cancer Maternal Grandfather      Family History Negative Paternal Grandmother       from old age 103     Family History Negative Paternal Grandfather      gangrene, no diabetes     Breast Cancer No family hx of      Cancer - colorectal No family hx of          Current Outpatient Prescriptions   Medication Sig Dispense Refill     Calcium Carbonate (CALCIUM 500 PO) Take  by mouth.       hydrochlorothiazide 12.5 MG TABS tablet Take 1 tablet (12.5 mg) by mouth daily 30 tablet 11     irbesartan (AVAPRO) 150 MG tablet Take 1 tablet (150 mg) by mouth daily 30 tablet 11     mesalamine (ASACOL) 400 MG EC tablet Take 2 tablets (800 mg) by mouth daily as needed , pt usually is taking 1 -  800 mg per day 180 tablet 3     VITAMIN D3 1000 UNITS tablet TAKE  "ONE TABLET BY MOUTH EVERY DAY 90 tablet 0     Allergies   Allergen Reactions     Penicillin G Rash     Sulfa Drugs        Mammogram Screening: Patient over age 50, mutual decision to screen reflected in health maintenance.    Review of Systems   Constitutional: Negative for chills.   HENT: Negative for congestion, ear pain, hearing loss and sore throat.    Eyes: Negative for pain and visual disturbance.   Respiratory: Negative for cough and shortness of breath.    Cardiovascular: Positive for peripheral edema (periodically; later in the day; ). Negative for chest pain.   Gastrointestinal: Negative for abdominal pain, constipation, diarrhea, heartburn, hematochezia and nausea.        Pancolitis, colonoscopy done 4/2018; GI increased Asacol   Breasts:  Negative for tenderness, breast mass and discharge.        [facility] in [place]   Genitourinary: Negative for dysuria, frequency, genital sores, hematuria, pelvic pain, urgency, vaginal bleeding and vaginal discharge.   Musculoskeletal: Negative for arthralgias, joint swelling and myalgias.   Skin: Negative for rash.        Multiple keratoses; went to derm; was informed \"cosmetic\"   Neurological: Negative for dizziness, weakness and headaches.   Psychiatric/Behavioral: Negative for mood changes.         OBJECTIVE:   /76  Pulse 114  Temp 98  F (36.7  C) (Oral)  Resp 18  Ht 5' 4.5\" (1.638 m)  Wt 256 lb (116.1 kg)  SpO2 96%  BMI 43.26 kg/m2 Estimated body mass index is 43.26 kg/(m^2) as calculated from the following:    Height as of this encounter: 5' 4.5\" (1.638 m).    Weight as of this encounter: 256 lb (116.1 kg).  Physical Exam  GENERAL: healthy, alert and no distress  EYES: Eyes grossly normal to inspection  NECK: no adenopathy, no asymmetry, masses, or scars and thyroid normal to palpation  RESP: lungs clear to auscultation - no rales, rhonchi or wheezes  BREAST: normal without masses, tenderness or nipple discharge and no palpable " axillary masses or adenopathy  CV: regular rates and rhythm, normal S1 S2,   peripheral pulses strong and  Note trace peripheral edema  ABDOMEN: soft, nontender, no hepatosplenomegaly, no masses and bowel sounds normal  MS: no gross musculoskeletal defects noted, no edema  SKIN: multiple benign appearing keratoses  NEURO: Normal strength and tone, mentation intact and speech normal  PSYCH: mentation appears normal, affect normal/bright    ASSESSMENT / PLAN:   (Z00.00) Routine history and physical examination of adult  (primary encounter diagnosis)  Comment: HEALTH CARE MAINTENANCE reviewed. Next colonoscopy due 2020- pancolitis   Plan:     (I10) Essential hypertension  Comment: Due to med availablilty; changed Valsartan to Irbesartan due to medication recall; she prefers to take diuretic separately;   Reviewed potassium and renal function.  Plan: irbesartan (AVAPRO) 150 MG tablet,         hydrochlorothiazide 12.5 MG TABS tablet          (Z12.31) Visit for screening mammogram  Comment: Clinical Breast Exam up to date  Plan: MA Screening Digital Bilateral          (E78.5) Hyperlipidemia LDL goal <160  Comment: reviewed last several years;  Prefers to NOT be on statin  The 10-year ASCVD risk score ([place] DC Jr, et al., 2013) is: 9.8%    Values used to calculate the score:      Age: 68 years      Sex: Female      Is Non- : No      Diabetic: No      Tobacco smoker: No      Systolic Blood Pressure: 118 mmHg      Is BP treated: Yes      HDL Cholesterol: 64 mg/dL      Total Cholesterol: 229 mg/dL   Plan: desires to continue with diet and exercise;    (E66.01) Morbid obesity, unspecified obesity type (H) BMI>42  Comment: multifactorial  Plan: continue to be active and work toward weight loss    (K51.00) Ulcerative pancolitis without complication (H)  Comment:  [place] done 4/2018  pancolitis noted. They recommended increase in dose to Asacol 1200 mg BID; pt currently taking 1200 mg once  "daily and has NOT noted any bowel changes.  Plan: Asacol per [facility]    (L82.1) Seborrheic keratosis  Comment: multiple, benign skin changes; she has seen dermatology.   Plan: has seen Dermatology; no change in therapy     End of Life Planning:  Patient currently has an advanced directive: not currently on file    COUNSELING:  Reviewed preventive health counseling, as reflected in patient instructions       Regular exercise       Healthy diet/nutrition       Immunizations    Reviewed: consider Shingrix          BP Readings from Last 1 Encounters:   08/09/18 118/76     Estimated body mass index is 43.26 kg/(m^2) as calculated from the following:    Height as of this encounter: 5' 4.5\" (1.638 m).    Weight as of this encounter: 256 lb (116.1 kg).      Weight management plan: Discussed healthy diet and exercise guidelines and patient will follow up in 12 months in clinic to re-evaluate.     reports that she has never smoked. She has never used smokeless tobacco.      Appropriate preventive services were discussed with this patient, including applicable screening as appropriate for cardiovascular disease, diabetes, osteopenia/osteoporosis, and glaucoma.  As appropriate for age/gender, discussed screening for colorectal cancer, prostate cancer, breast cancer, and cervical cancer. Checklist reviewing preventive services available has been given to the patient.    Reviewed patients plan of care and provided an AVS. The Care Plan for Jaylyn meets the Care Plan requirement. This Care Plan has been established and reviewed with the Patient.    Counseling Resources:  ATP IV Guidelines  Pooled Cohorts Equation Calculator  Breast Cancer Risk Calculator  FRAX Risk Assessment  ICSI Preventive Guidelines  Dietary Guidelines for Americans, 2010  USDA's MyPlate  ASA Prophylaxis  Lung CA Screening    Luz Elena Paul MD  Internal Medicine   [facility]  25 minutes in addition to HEALTH CARE MAINTENANCE are spent with " patient, over 50% of that time spent providing counselling, discussing and reviewing medical conditions/concerns, meds and potential side effects.    Answers for HPI/ROS submitted by the patient on 8/9/2018   PHQ-2 Score: 2

## 2018-08-16 ENCOUNTER — HOSPITAL ENCOUNTER (OUTPATIENT)
Dept: MAMMOGRAPHY | Facility: CLINIC | Age: 68
Discharge: HOME | End: 2018-08-16
Attending: INTERNAL MEDICINE | Admitting: INTERNAL MEDICINE
Payer: MEDICARE

## 2018-08-16 DIAGNOSIS — Z12.31 VISIT FOR SCREENING MAMMOGRAM: ICD-10-CM

## 2018-08-16 PROCEDURE — 77063 BREAST TOMOSYNTHESIS BI: CPT

## 2018-11-09 DIAGNOSIS — E55.9 VITAMIN D DEFICIENCY: ICD-10-CM

## 2018-11-09 NOTE — TELEPHONE ENCOUNTER
"Requested Prescriptions   Pending Prescriptions Disp Refills     VITAMIN D3 1000 units tablet [Pharmacy Med Name: VITAMIN D3 1000UNIT TABS]  Last Written Prescription Date:  1/17/18  Last Fill Quantity: 90,  # refills: 0   Last office visit: 8/9/2018 with prescribing provider:  Luz Elena Paul MD    Future Office Visit:     90 tablet 0     Sig: TAKE ONE TABLET BY MOUTH EVERY DAY    Vitamin Supplements (Adult) Protocol Passed    11/9/2018 10:35 AM       Passed - High dose Vitamin D not ordered       Passed - Recent (12 mo) or future (30 days) visit within the authorizing provider's specialty    Patient had office visit in the last 12 months or has a visit in the next 30 days with authorizing provider or within the authorizing provider's specialty.  See \"Patient Info\" tab in inbasket, or \"Choose Columns\" in Meds & Orders section of the refill encounter.                "

## 2018-11-13 NOTE — TELEPHONE ENCOUNTER
Routing refill request to provider for review/approval because:  A break in OTC medication  Shannan Craig RN - Triage  [facility]

## 2018-11-14 RX ORDER — CHOLECALCIFEROL (VITAMIN D3) 25 MCG
TABLET ORAL
Qty: 90 TABLET | Refills: 0 | Status: SHIPPED | OUTPATIENT
Start: 2018-11-14 | End: 2020-12-01

## 2019-04-29 ENCOUNTER — TRANSFERRED RECORDS (OUTPATIENT)
Dept: HEALTH INFORMATION MANAGEMENT | Facility: CLINIC | Age: 69
End: 2019-04-29

## 2019-08-30 DIAGNOSIS — E78.5 HYPERLIPIDEMIA LDL GOAL <160: Primary | ICD-10-CM

## 2019-08-30 DIAGNOSIS — I10 ESSENTIAL HYPERTENSION: ICD-10-CM

## 2019-08-30 NOTE — TELEPHONE ENCOUNTER
"Requested Prescriptions   Pending Prescriptions Disp Refills     irbesartan (AVAPRO) 150 MG tablet [Pharmacy Med Name: IRBESARTAN 150MG TABS] 30 tablet 11     Sig: TAKE ONE TABLET BY MOUTH EVERY DAY   Last Written Prescription Date:  8/9/18  Last Fill Quantity: 30,  # refills: 11   Last office visit: 8/9/2018 with prescribing provider:  Luz Elena Paul MD   Future Office Visit:        Angiotensin-II Receptors Failed - 8/30/2019  9:03 AM        Failed - Last blood pressure under 140/90 in past 12 months     BP Readings from Last 3 Encounters:   08/09/18 118/76   07/17/17 130/62   10/03/16 126/82                 Failed - Recent (12 mo) or future (30 days) visit within the authorizing provider's specialty     Patient had office visit in the last 12 months or has a visit in the next 30 days with authorizing provider or within the authorizing provider's specialty.  See \"Patient Info\" tab in inbasket, or \"Choose Columns\" in Meds & Orders section of the refill encounter.              Failed - Normal serum creatinine on file in past 12 months     Recent Labs   Lab Test 08/02/18  0847   CR 0.76             Failed - Normal serum potassium on file in past 12 months     Recent Labs   Lab Test 08/02/18  0847   POTASSIUM 4.0                    Passed - Medication is active on med list        Passed - Patient is age 18 or older        Passed - No active pregnancy on record        Passed - No positive pregnancy test in past 12 months        hydrochlorothiazide (HYDRODIURIL) 12.5 MG tablet [Pharmacy Med Name: HYDROCHLOROTHIAZIDE 12.5MG TABS] 30 tablet 11     Sig: TAKE ONE TABLET BY MOUTH EVERY DAY   Last Written Prescription Date:  8/9/18  Last Fill Quantity: 30,  # refills: 11   Last office visit: 8/9/2018 with prescribing provider:  Luz Elena Paul MD   Future Office Visit:        Diuretics (Including Combos) Protocol Failed - 8/30/2019  9:03 AM        Failed - Blood pressure under 140/90 in past 12 months     BP " "Readings from Last 3 Encounters:   08/09/18 118/76   07/17/17 130/62   10/03/16 126/82                 Failed - Recent (12 mo) or future (30 days) visit within the authorizing provider's specialty     Patient had office visit in the last 12 months or has a visit in the next 30 days with authorizing provider or within the authorizing provider's specialty.  See \"Patient Info\" tab in inbasket, or \"Choose Columns\" in Meds & Orders section of the refill encounter.              Failed - Normal serum creatinine on file in past 12 months     Recent Labs   Lab Test 08/02/18  0847   CR 0.76              Failed - Normal serum potassium on file in past 12 months     Recent Labs   Lab Test 08/02/18  0847   POTASSIUM 4.0                    Failed - Normal serum sodium on file in past 12 months     Recent Labs   Lab Test 08/02/18  0847                 Passed - Medication is active on med list        Passed - Patient is age 18 or older        Passed - No active pregancy on record        Passed - No positive pregnancy test in past 12 months          "

## 2019-09-03 NOTE — TELEPHONE ENCOUNTER
Routing refill request to provider for review/approval because:  Protocols failed.     Will also route to TC's to assist pt in scheduling.     Viri MORENO RN

## 2019-09-06 RX ORDER — IRBESARTAN 150 MG/1
TABLET ORAL
Qty: 30 TABLET | Refills: 0 | Status: SHIPPED | OUTPATIENT
Start: 2019-09-06 | End: 2019-10-07

## 2019-09-06 RX ORDER — HYDROCHLOROTHIAZIDE 12.5 MG/1
TABLET ORAL
Qty: 30 TABLET | Refills: 0 | Status: SHIPPED | OUTPATIENT
Start: 2019-09-06 | End: 2019-10-07

## 2019-09-30 DIAGNOSIS — I10 ESSENTIAL HYPERTENSION: ICD-10-CM

## 2019-09-30 DIAGNOSIS — E78.5 HYPERLIPIDEMIA LDL GOAL <160: ICD-10-CM

## 2019-09-30 LAB
ALBUMIN SERPL-MCNC: 3.3 G/DL (ref 3.4–5)
ALP SERPL-CCNC: 83 U/L (ref 40–150)
ALT SERPL W P-5'-P-CCNC: 18 U/L (ref 0–50)
ANION GAP SERPL CALCULATED.3IONS-SCNC: 7 MMOL/L (ref 3–14)
AST SERPL W P-5'-P-CCNC: 11 U/L (ref 0–45)
BILIRUB SERPL-MCNC: 0.5 MG/DL (ref 0.2–1.3)
BUN SERPL-MCNC: 17 MG/DL (ref 7–30)
CALCIUM SERPL-MCNC: 9.1 MG/DL (ref 8.5–10.1)
CHLORIDE SERPL-SCNC: 106 MMOL/L (ref 94–109)
CHOLEST SERPL-MCNC: 230 MG/DL
CO2 SERPL-SCNC: 27 MMOL/L (ref 20–32)
CREAT SERPL-MCNC: 0.76 MG/DL (ref 0.52–1.04)
CREAT UR-MCNC: 166 MG/DL
GFR SERPL CREATININE-BSD FRML MDRD: 79 ML/MIN/{1.73_M2}
GLUCOSE SERPL-MCNC: 99 MG/DL (ref 70–99)
HDLC SERPL-MCNC: 65 MG/DL
LDLC SERPL CALC-MCNC: 143 MG/DL
MICROALBUMIN UR-MCNC: 12 MG/L
MICROALBUMIN/CREAT UR: 7.05 MG/G CR (ref 0–25)
NONHDLC SERPL-MCNC: 165 MG/DL
POTASSIUM SERPL-SCNC: 3.8 MMOL/L (ref 3.4–5.3)
PROT SERPL-MCNC: 7.4 G/DL (ref 6.8–8.8)
SODIUM SERPL-SCNC: 140 MMOL/L (ref 133–144)
TRIGL SERPL-MCNC: 111 MG/DL

## 2019-09-30 PROCEDURE — 80053 COMPREHEN METABOLIC PANEL: CPT | Performed by: INTERNAL MEDICINE

## 2019-09-30 PROCEDURE — 80061 LIPID PANEL: CPT | Performed by: INTERNAL MEDICINE

## 2019-09-30 PROCEDURE — 36415 COLL VENOUS BLD VENIPUNCTURE: CPT | Performed by: INTERNAL MEDICINE

## 2019-09-30 PROCEDURE — 82043 UR ALBUMIN QUANTITATIVE: CPT | Performed by: INTERNAL MEDICINE

## 2019-10-07 ENCOUNTER — OFFICE VISIT (OUTPATIENT)
Dept: FAMILY MEDICINE | Facility: CLINIC | Age: 69
End: 2019-10-07
Payer: COMMERCIAL

## 2019-10-07 VITALS
TEMPERATURE: 98.1 F | HEIGHT: 65 IN | RESPIRATION RATE: 17 BRPM | BODY MASS INDEX: 44.47 KG/M2 | HEART RATE: 84 BPM | OXYGEN SATURATION: 98 % | DIASTOLIC BLOOD PRESSURE: 74 MMHG | WEIGHT: 266.9 LBS | SYSTOLIC BLOOD PRESSURE: 128 MMHG

## 2019-10-07 DIAGNOSIS — E78.00 ELEVATED LDL CHOLESTEROL LEVEL: ICD-10-CM

## 2019-10-07 DIAGNOSIS — Z00.00 ENCOUNTER FOR MEDICARE ANNUAL WELLNESS EXAM: Primary | ICD-10-CM

## 2019-10-07 DIAGNOSIS — Z78.0 MENOPAUSE: ICD-10-CM

## 2019-10-07 DIAGNOSIS — K51.00 ULCERATIVE PANCOLITIS WITHOUT COMPLICATION (H): ICD-10-CM

## 2019-10-07 DIAGNOSIS — E66.01 MORBID OBESITY, UNSPECIFIED OBESITY TYPE (H): ICD-10-CM

## 2019-10-07 DIAGNOSIS — I10 ESSENTIAL HYPERTENSION: ICD-10-CM

## 2019-10-07 PROCEDURE — 99213 OFFICE O/P EST LOW 20 MIN: CPT | Mod: 25 | Performed by: INTERNAL MEDICINE

## 2019-10-07 PROCEDURE — 99207 C PAF COMPLETED  NO CHARGE: CPT | Performed by: INTERNAL MEDICINE

## 2019-10-07 PROCEDURE — G0438 PPPS, INITIAL VISIT: HCPCS | Performed by: INTERNAL MEDICINE

## 2019-10-07 RX ORDER — IRBESARTAN 150 MG/1
150 TABLET ORAL DAILY
Qty: 90 TABLET | Refills: 11 | Status: SHIPPED | OUTPATIENT
Start: 2019-10-07 | End: 2020-10-26

## 2019-10-07 RX ORDER — HYDROCHLOROTHIAZIDE 12.5 MG/1
12.5 TABLET ORAL DAILY
Qty: 90 TABLET | Refills: 3 | Status: SHIPPED | OUTPATIENT
Start: 2019-10-07 | End: 2020-10-26

## 2019-10-07 ASSESSMENT — ENCOUNTER SYMPTOMS
CONSTITUTIONAL NEGATIVE: 1
RESPIRATORY NEGATIVE: 1
HEMATOLOGIC/LYMPHATIC NEGATIVE: 1
PSYCHIATRIC NEGATIVE: 1
GASTROINTESTINAL NEGATIVE: 1
EYES NEGATIVE: 1
ENDOCRINE NEGATIVE: 1
ALLERGIC/IMMUNOLOGIC NEGATIVE: 1
CARDIOVASCULAR NEGATIVE: 1
NEUROLOGICAL NEGATIVE: 1
MUSCULOSKELETAL NEGATIVE: 1

## 2019-10-07 ASSESSMENT — MIFFLIN-ST. JEOR: SCORE: 1736.53

## 2019-10-07 ASSESSMENT — ACTIVITIES OF DAILY LIVING (ADL): CURRENT_FUNCTION: NO ASSISTANCE NEEDED

## 2019-10-07 NOTE — PROGRESS NOTES
"Chief Complaint   Patient presents with     Hypertension     Physical       SUBJECTIVE:   Jaylyn Gonzalez is a 68 year old female who presents for Preventive Visit.    Pt also has several chronic health conditions which require physical exam, laboratory evaluation and review of medications    Are you in the first 12 months of your Medicare coverage?      Healthy Habits:    In general, how would you rate your overall health?  Good    Frequency of exercise:  2-3 days/week    Duration of exercise:  15-30 minutes    Do you usually eat at least 4 servings of fruit and vegetables a day, include whole grains    & fiber and avoid regularly eating high fat or \"junk\" foods?  Yes    Taking medications regularly:  No    Barriers to taking medications:  Problems remembering to take them    Medication side effects:  None    Ability to successfully perform activities of daily living:  No assistance needed    Home Safety:  No safety concerns identified    Hearing Impairment:  No hearing concerns    In the past 6 months, have you been bothered by leaking of urine?  No    In general, how would you rate your overall mental or emotional health?  Good      PHQ-2 Total Score:    Additional concerns today:  No    Do you feel safe in your environment? Yes    Do you have a Health Care Directive? No: Advance care planning reviewed with patient      Fall risk  Fallen 2 or more times in the past year?: No  Any fall with injury in the past year?: No    Cognitive Screening   1) Repeat 3 items (Leader, Season, Table)    2) Clock draw: NORMAL  3) 3 item recall: Recalls 2 objects   Results: NORMAL clock, 1-2 items recalled: COGNITIVE IMPAIRMENT LESS LIKELY    Mini-CogTM Copyright MARISABEL Milligan. Licensed by the author for use in [facility]; reprinted with permission (farzaneh@.[email or web address]). All rights reserved.      Do you have sleep apnea, excessive snoring or daytime drowsiness?: no    Reviewed and updated as needed this visit by clinical " staff  Tobacco  Allergies  Meds         Reviewed and updated as needed this visit by Provider        Social History     Tobacco Use     Smoking status: Never Smoker     Smokeless tobacco: Never Used   Substance Use Topics     Alcohol use: Yes     Comment: occ     If you drink alcohol do you typically have >3 drinks per day or >7 drinks per week? Not applicable    Alcohol Use 8/9/2018   Prescreen: >3 drinks/day or >7 drinks/week? Not Applicable   Prescreen: >3 drinks/day or >7 drinks/week? -           Hypertension Follow-up      Do you check your blood pressure regularly outside of the clinic? No     Are you following a low salt diet? No    Are your blood pressures ever more than 140 on the top number (systolic) OR more   than 90 on the bottom number (diastolic), for example 140/90? No    Current providers sharing in care for this patient include:   Patient Care Team:  Luz Elena Paul MD as PCP - General (Internal Medicine)  Luz Elena Paul MD as Assigned PCP    The following health maintenance items are reviewed in Epic and correct as of today:  Health Maintenance   Topic Date Due     ZOSTER IMMUNIZATION (1 of 2) 10/15/1999     PHQ-2  01/01/2019     MEDICARE ANNUAL WELLNESS VISIT  08/09/2019     FALL RISK ASSESSMENT  08/09/2019     DEXA  07/17/2020     MAMMO SCREENING  08/16/2020     ADVANCE CARE PLANNING  07/17/2022     LIPID  09/30/2024     DTAP/TDAP/TD IMMUNIZATION (3 - Td) 07/17/2027     COLONOSCOPY  04/16/2028     HEPATITIS C SCREENING  Completed     PNEUMOCOCCAL IMMUNIZATION 65+ LOW/MEDIUM RISK  Completed     IPV IMMUNIZATION  Aged Out     MENINGITIS IMMUNIZATION  Aged Out     Labs reviewed in EPIC  BP Readings from Last 3 Encounters:   10/07/19 128/74   08/09/18 118/76   07/17/17 130/62    Wt Readings from Last 3 Encounters:   10/07/19 121.1 kg (266 lb 14.4 oz)   08/09/18 116.1 kg (256 lb)   07/17/17 115.1 kg (253 lb 11.2 oz)                  Patient Active Problem List   Diagnosis     UC  (ulcerative colitis) (H)     HTN (hypertension)     Hyperlipidemia LDL goal <160     Advanced directives, counseling/discussion     Seborrheic keratosis     Vitamin D deficiency     Hiatal hernia     Paraumbilical hernia     Snoring     Obesity     Fatigue     Cystocele     Morbid obesity, unspecified obesity type (H)     Past Surgical History:   Procedure Laterality Date     COLONOSCOPY  2011    Procedure:COMBINED COLONOSCOPY, SINGLE BIOPSY/POLYPECTOMY BY BIOPSY; COLONOSCOPY  cold bx's; Surgeon:NOE DIAZ; Location:[facility]     ENDOSCOPIC RETROGRADE CHOLANGIOPANCREATOGRAM  2012    Procedure: ENDOSCOPIC RETROGRADE CHOLANGIOPANCREATOGRAM;  ENDOSCOPIC RETROGRADE CHOLANGIOPANCREATOGRAM ;  Surgeon: Noe Diaz MD;  Location: [facility] OR     GYN SURGERY       HYSTERECTOMY       hysterectomy total  1995    spot in uterus, cancerous, no follow up needed     LAPAROSCOPIC CHOLECYSTECTOMY  2012    Procedure: LAPAROSCOPIC CHOLECYSTECTOMY;  LAPAROSCOPIC CHOLECYSTECTOMY;  Surgeon: Viri Garcia MD;  Location: [facility] OR     LAPAROTOMY EXPLORATORY       MOUTH SURGERY         Social History     Tobacco Use     Smoking status: Never Smoker     Smokeless tobacco: Never Used   Substance Use Topics     Alcohol use: Yes     Comment: occ     Family History   Problem Relation Age of Onset     Parkinsonism Mother          in her 80's ,dementia      Neurologic Disorder Mother         parkinson     Family History Negative Father          from sepsis after hip surgery      Hypertension Father      Psychotic Disorder Brother         1-in group home -MR-66 yo old      Heart Failure Maternal Grandmother      Prostate Cancer Maternal Grandfather      Family History Negative Paternal Grandmother          from old age 103     Family History Negative Paternal Grandfather         gangrene, no diabetes     Breast Cancer No family hx of      Cancer - colorectal No family hx of          Current Outpatient Medications  "  Medication Sig Dispense Refill     Calcium Carbonate (CALCIUM 500 PO) Take  by mouth.       hydrochlorothiazide (HYDRODIURIL) 12.5 MG tablet Take 1 tablet (12.5 mg) by mouth daily 90 tablet 3     irbesartan (AVAPRO) 150 MG tablet Take 1 tablet (150 mg) by mouth daily 90 tablet 11     mesalamine (ASACOL) 400 MG EC tablet Take 2 tablets (800 mg) by mouth daily as needed , pt usually is taking 1 -  800 mg per day 180 tablet 3     VITAMIN D3 1000 units tablet TAKE ONE TABLET BY MOUTH EVERY DAY 90 tablet 0     Allergies   Allergen Reactions     Penicillin G Rash     Sulfa Drugs      Recent Labs   Lab Test 09/30/19  0800 08/02/18  0847 07/17/17  1142  11/04/13  1234  07/12/12  0958   * 148* 154*   < > 141*  --   --    HDL 65 64 60   < > 61  --   --    TRIG 111 85 133   < > 85  --   --    ALT 18 16 18   < > 30   < > 13   CR 0.76 0.76 0.76   < > 0.68   < > 0.69   GFRESTIMATED 79 75 75   < > 87   < > 86   GFRESTBLACK >90 >90 >90  African American GFR Calc     < > >90   < > >90   POTASSIUM 3.8 4.0 3.6   < > 3.6   < > 4.1   TSH  --   --   --   --  2.57  --  3.97    < > = values in this interval not displayed.      Mammogram Screening: Mammogram Screening: Patient over age 50, mutual decision to screen reflected in health maintenance.    Review of Systems   Constitutional: Negative.    HENT: Negative.    Eyes: Negative.    Respiratory: Negative.    Cardiovascular: Negative.    Gastrointestinal: Negative.    Endocrine: Negative.    Breasts:  negative.    Genitourinary: Negative.    Musculoskeletal: Negative.    Skin: Negative.    Allergic/Immunologic: Negative.    Neurological: Negative.    Hematological: Negative.    Psychiatric/Behavioral: Negative.          OBJECTIVE:   /74   Pulse 84   Temp 98.1  F (36.7  C) (Oral)   Resp 17   Ht 1.651 m (5' 5\")   Wt 121.1 kg (266 lb 14.4 oz)   SpO2 98%   BMI 44.41 kg/m   Estimated body mass index is 44.41 kg/m  as calculated from the following:    Height as of this " "encounter: 1.651 m (5' 5\").    Weight as of this encounter: 121.1 kg (266 lb 14.4 oz).  Physical Exam  GENERAL: healthy, alert and no distress  HENT: ear canals and TM's normal, nose and mouth without ulcers or lesions  NECK: no adenopathy, no asymmetry, masses, or scars and thyroid normal to palpation  RESP: lungs clear to auscultation - no rales, rhonchi or wheezes  CV: regular rates and rhythm, normal S1 S2, no S3 or S4, peripheral pulses strong and no peripheral edema  ABDOMEN: soft, nontender, no hepatosplenomegaly, no masses and bowel sounds normal  MS: no gross musculoskeletal defects noted, no edema  SKIN: no suspicious lesions or rashes  NEURO: Normal strength and tone, sensory exam grossly normal and mentation intact  PSYCH: mentation appears normal, affect normal/bright    Diagnostic Test Results:  Labs reviewed in Epic    ASSESSMENT / PLAN:   (Z00.00) Encounter for Medicare annual wellness exam  (primary encounter diagnosis)  Comment: HEALTH CARE MAINTENANCE reviewed ; discussed immunizations- will look into coverage for Shingrix.   Plan:     (I10) Essential hypertension  Comment: med availablilty; changed  Irbesartan; diuretic separate; preferred by pt- more cost effective.   Plan: hydrochlorothiazide (HYDRODIURIL) 12.5 MG         tablet, irbesartan (AVAPRO) 150 MG tablet          (E66.01) Morbid obesity, unspecified obesity type (H)  Comment: Body mass index is 44.41 kg/m .   Plan: encourage regular exercise- \"walk in big box stores\"    (E78.00) Elevated LDL cholesterol level  Comment: If risk greater than 7.5%,statin therapy considered. 11.4% risk    Discussed medications; prefer to work on diet and exercise; hold off on medications;   Plan: Lipid panel reflex to direct LDL Fasting,         Comprehensive metabolic panel            (K51.00) Ulcerative pancolitis without complication (H)  Comment: sees GI and continues on Asachol.   Plan: Asachol.    (Z78.0) Menopause  Comment: DEXA recommended;   Plan: " "DX Hip/Pelvis/Spine  Recommendations include ensuring adequate daily Calcium and Vitamin D intake and regular weight bearing exercise            End of Life Planning:  Patient currently has an advanced directive: as noted.    COUNSELING:  Reviewed preventive health counseling, as reflected in patient instructions       Regular exercise       Healthy diet/nutrition       Immunizations    Vaccinated for: Influenza          Estimated body mass index is 44.41 kg/m  as calculated from the following:    Height as of this encounter: 1.651 m (5' 5\").    Weight as of this encounter: 121.1 kg (266 lb 14.4 oz).    Weight management plan: Discussed healthy diet and exercise guidelines     reports that she has never smoked. She has never used smokeless tobacco.      Appropriate preventive services were discussed with this patient, including applicable screening as appropriate for cardiovascular disease, diabetes, osteopenia/osteoporosis, and glaucoma.  As appropriate for age/gender, discussed screening for colorectal cancer, prostate cancer, breast cancer, and cervical cancer. Checklist reviewing preventive services available has been given to the patient.    Reviewed patients plan of care and provided an AVS. The Care Plan for Jaylyn meets the Care Plan requirement. This Care Plan has been established and reviewed with the Patient.    Counseling Resources:  ATP IV Guidelines  Pooled Cohorts Equation Calculator  Breast Cancer Risk Calculator  FRAX Risk Assessment  ICSI Preventive Guidelines  Dietary Guidelines for Americans, 2010  USDA's MyPlate  ASA Prophylaxis  Lung CA Screening    Luz Elena Paul MD  Internal Medicine   [facility]    15 minutes in addition to HEALTH CARE MAINTENANCE are spent with patient, over 50% of that time spent providing counselling, discussing and reviewing medical conditions/concerns, meds and potential side effects.    Identified Health Risks:  "

## 2019-10-07 NOTE — PATIENT INSTRUCTIONS
Patient Education   Personalized Prevention Plan  You are due for the preventive services outlined below.  Your care team is available to assist you in scheduling these services.  If you have already completed any of these items, please share that information with your care team to update in your medical record.  Health Maintenance Due   Topic Date Due     Zoster (Shingles) Vaccine (1 of 2) 10/15/1999     PHQ-2  01/01/2019     Annual Wellness Visit  08/09/2019     FALL RISK ASSESSMENT  08/09/2019           The 10-year ASCVD risk score (Juana ARRINGTON Jr., et al., 2013) is: 11.4%    Values used to calculate the score:      Age: 68 years      Sex: Female      Is Non- : No      Diabetic: No      Tobacco smoker: No      Systolic Blood Pressure: 128 mmHg      Is BP treated: Yes      HDL Cholesterol: 65 mg/dL      Total Cholesterol: 230 mg/dL      If risk greater than 7.5%, then statin therapy should be considered.    Discussed medications; prefer to work on diet and exercise; hold off on medications; reassess in 6-12 months.

## 2019-10-07 NOTE — LETTER
[facility]  [address] [address]  [phone]        December 19, 2019    Jaylyn Gonzalez  [address] [address] [address]              Dear Jaylyn Gonzalez    This is to remind you that your fasting labs are due.    You may call our office at [phone]  to schedule an appointment.    Please disregard this notice if you have already had your labs drawn or made an appointment.        Sincerely,        Luz Elena Paul MD and [place] lab staff

## 2019-10-24 ENCOUNTER — TELEPHONE (OUTPATIENT)
Dept: FAMILY MEDICINE | Facility: CLINIC | Age: 69
End: 2019-10-24

## 2019-10-28 ENCOUNTER — ANCILLARY PROCEDURE (OUTPATIENT)
Dept: BONE DENSITY | Facility: CLINIC | Age: 69
End: 2019-10-28
Attending: INTERNAL MEDICINE
Payer: COMMERCIAL

## 2019-10-28 DIAGNOSIS — Z78.0 MENOPAUSE: ICD-10-CM

## 2019-10-28 PROCEDURE — 77080 DXA BONE DENSITY AXIAL: CPT | Performed by: INTERNAL MEDICINE

## 2019-11-18 ENCOUNTER — TELEPHONE (OUTPATIENT)
Dept: FAMILY MEDICINE | Facility: CLINIC | Age: 69
End: 2019-11-18

## 2019-11-18 NOTE — TELEPHONE ENCOUNTER
Notes recorded by Chepe Olivares on 11/18/2019 at 1:41 PM CST  2nd attempt, LM for patient to call back.  Needs to schedule apt with PCP for DEXA results, showed osteopenia.  Chepe Olivares CMA ([facility])    ------    Notes recorded by Chepe Olivares on 11/7/2019 at 4:27 PM CST  1st attempt, called and talked to patient, she will call back to schedule.  Chepe Olivares CMA ([facility])    ------    Notes recorded by Luz Elena Paul MD on 10/29/2019 at 9:09 PM CDT  Osteopenia noted on DEXA  Please make a follow up appt to discuss treatment options.   Scheduling team to assist with scheduling.

## 2019-11-26 ENCOUNTER — TRANSFERRED RECORDS (OUTPATIENT)
Dept: HEALTH INFORMATION MANAGEMENT | Facility: CLINIC | Age: 69
End: 2019-11-26

## 2019-11-26 LAB — CREAT SERPL-MCNC: 0.75 MG/DL (ref 0.6–1.02)

## 2020-01-09 ENCOUNTER — OFFICE VISIT (OUTPATIENT)
Dept: FAMILY MEDICINE | Facility: CLINIC | Age: 70
End: 2020-01-09
Payer: COMMERCIAL

## 2020-01-09 VITALS
SYSTOLIC BLOOD PRESSURE: 120 MMHG | WEIGHT: 273.9 LBS | DIASTOLIC BLOOD PRESSURE: 70 MMHG | TEMPERATURE: 98 F | BODY MASS INDEX: 45.63 KG/M2 | OXYGEN SATURATION: 98 % | HEIGHT: 65 IN | RESPIRATION RATE: 19 BRPM | HEART RATE: 83 BPM

## 2020-01-09 DIAGNOSIS — M85.851 OSTEOPENIA OF NECKS OF BOTH FEMURS: Primary | ICD-10-CM

## 2020-01-09 DIAGNOSIS — K51.00 ULCERATIVE PANCOLITIS WITHOUT COMPLICATION (H): ICD-10-CM

## 2020-01-09 DIAGNOSIS — E66.01 MORBID OBESITY, UNSPECIFIED OBESITY TYPE (H): ICD-10-CM

## 2020-01-09 DIAGNOSIS — M85.852 OSTEOPENIA OF NECKS OF BOTH FEMURS: Primary | ICD-10-CM

## 2020-01-09 PROCEDURE — 99214 OFFICE O/P EST MOD 30 MIN: CPT | Performed by: INTERNAL MEDICINE

## 2020-01-09 RX ORDER — CHOLECALCIFEROL (VITAMIN D3) 50 MCG
1 TABLET ORAL DAILY
Qty: 90 TABLET | Refills: 4 | Status: SHIPPED | OUTPATIENT
Start: 2020-01-09 | End: 2020-12-01

## 2020-01-09 RX ORDER — ALENDRONATE SODIUM 70 MG/1
70 TABLET ORAL
Qty: 12 TABLET | Refills: 3 | Status: SHIPPED | OUTPATIENT
Start: 2020-01-09 | End: 2020-08-13

## 2020-01-09 ASSESSMENT — MIFFLIN-ST. JEOR: SCORE: 1763.28

## 2020-01-09 NOTE — PROGRESS NOTES
Subjective     Jaylyn Gonzalez is a 69 year old female who presents to clinic today for the following health issues:    HPI   Patient presents with:  Results: follow up on Dexa         How many servings of fruits and vegetables do you eat daily?  2-3    On average, how many sweetened beverages do you drink each day (Examples: soda, juice, sweet tea, etc.  Do NOT count diet or artificially sweetened beverages)?   2  How many days per week do you miss taking your medication? 2    What makes it hard for you to take your medications?  remembering to take    At times misses her hydrochlorothiazide due to leaving the house with her  for appointments and she can not always make it to the bathroom.       No hx of peptic ulcer disease of GERD  She is postmenopausal  Taking Vit D3 1000 international units daily.  Nonsmoker    Reviewed DEXA from     BONE DENSITOMETRY  [facility] [address] [place] [address]  10/28/2019      PATIENT: Jaylyn Gonzalez  CHART: [id number]   :  1950  AGE:  69 year old  SEX:  female   REFERRING PROVIDER:  Luz Elena Paul MD     PROCEDURE:  Bone density scanning was performed using DXA technology of the lumbar spine and hip.  Scanning was performed on a Lunar Prodigy scanner.  Reporting is completed in the form of a T-score.  The T-score represents the standard deviation from peak bone mass based on a young healthy adult.     REFERENCE T-SCORES:       Normal                -1.0 and greater                                 Osteopenia         Between -1.0 and -2.5                                           Osteoporosis     -2.5 and less                                      RISK FACTORS:  Post-menopausal, Height loss of 2 inches, Parent history of osteoporosis with a hip fracture, Fracture of leg, age and location not dicumented, Condition related to bone loss: inflammatory bowel disease  CURRENT TREATMENT:  Calcium     FINDINGS:               Lumbar  "Spine (L1-L4)      T-score:  0.1, degenerative changes present               Left Femoral Neck            T-score:  -1.6               Right Femoral Neck          T-score:  -1.8                  Lumbar (L1-L4) BMD: 1.210                 Total Hip Mean BMD: 0.851       IMPRESSION  Osteopenia., Degenerative changes of the lumbar spine which may falsely elevate results.     Patient had a study performed previously, however the scans are not available to compare to the current study.      Recommendations include ensuring adequate Calcium and Vitamin D.     The current NOF Guidelines recommend treatment for patients with prior hip or vertebral fracture, T-score -2.5 or below, or 10 year risk of any major osteoporotic fracture >20% or 10 year risk of hip fracture >3%, as calculated using the FRAX calculator (www.shef.ac.uk/FRAX or you can google \"FRAX\").       This patient's risks based on available information, with the use of FRAX, are 23 % for major osteoporotic fracture and 4.7 % for hip fracture.   Based on these guidelines, treatment (in addition to calcium and vitamin D) is recommended for this patient, after ruling out other causes of osteoporosis.  This is meant as an aid to clinical decision making; one must still use clinical judgement.        Follow up can be considered in 2 years.   ___________________  Deann Frank M.D.  Electronically signed           Patient Active Problem List   Diagnosis     UC (ulcerative colitis) (H)     HTN (hypertension)     Hyperlipidemia LDL goal <160     Advanced directives, counseling/discussion     Seborrheic keratosis     Vitamin D deficiency     Hiatal hernia     Paraumbilical hernia     Snoring     Obesity     Fatigue     Cystocele     Morbid obesity, unspecified obesity type (H)     Past Surgical History:   Procedure Laterality Date     COLONOSCOPY  11/7/2011    Procedure:COMBINED COLONOSCOPY, SINGLE BIOPSY/POLYPECTOMY BY BIOPSY; COLONOSCOPY  cold bx's; Surgeon:ISAI HOFFMAN " A; Location:[facility]     ENDOSCOPIC RETROGRADE CHOLANGIOPANCREATOGRAM  2012    Procedure: ENDOSCOPIC RETROGRADE CHOLANGIOPANCREATOGRAM;  ENDOSCOPIC RETROGRADE CHOLANGIOPANCREATOGRAM ;  Surgeon: Noe Diaz MD;  Location: [facility]     GYN SURGERY       HYSTERECTOMY       hysterectomy total  1995    spot in uterus, cancerous, no follow up needed     LAPAROSCOPIC CHOLECYSTECTOMY  2012    Procedure: LAPAROSCOPIC CHOLECYSTECTOMY;  LAPAROSCOPIC CHOLECYSTECTOMY;  Surgeon: Viri Garcia MD;  Location: [facility]     LAPAROTOMY EXPLORATORY       MOUTH SURGERY         Social History     Tobacco Use     Smoking status: Never Smoker     Smokeless tobacco: Never Used   Substance Use Topics     Alcohol use: Yes     Comment: occ     Family History   Problem Relation Age of Onset     Parkinsonism Mother          in her 80's ,dementia      Neurologic Disorder Mother         parkinson     Family History Negative Father          from sepsis after hip surgery      Hypertension Father      Psychotic Disorder Brother         1-in group home -MR-64 yo old      Heart Failure Maternal Grandmother      Prostate Cancer Maternal Grandfather      Family History Negative Paternal Grandmother          from old age 103     Family History Negative Paternal Grandfather         gangrene, no diabetes     Breast Cancer No family hx of      Cancer - colorectal No family hx of          Current Outpatient Medications   Medication Sig Dispense Refill     alendronate (FOSAMAX) 70 MG tablet Take 1 tablet (70 mg) by mouth every 7 days Take on empty stomach, with 8 oz of water and not eat or  lay down for 30-40 minutes 12 tablet 3     Calcium Carbonate (CALCIUM 500 PO) Take  by mouth.       hydrochlorothiazide (HYDRODIURIL) 12.5 MG tablet Take 1 tablet (12.5 mg) by mouth daily 90 tablet 3     irbesartan (AVAPRO) 150 MG tablet Take 1 tablet (150 mg) by mouth daily 90 tablet 11     mesalamine (ASACOL) 400 MG EC tablet Take 2 tablets (800  mg) by mouth daily as needed , pt usually is taking 1 -  800 mg per day 180 tablet 3     vitamin D3 (CHOLECALCIFEROL) 2000 units (50 mcg) tablet Take 1 tablet (2,000 Units) by mouth daily 90 tablet 4     VITAMIN D3 1000 units tablet TAKE ONE TABLET BY MOUTH EVERY DAY 90 tablet 0     Allergies   Allergen Reactions     Penicillin G Rash     Sulfa Drugs      Recent Labs   Lab Test 11/26/19 09/30/19  0800 08/02/18  0847 07/17/17  1142  11/04/13  1234  07/12/12  0958   LDL  --  143* 148* 154*   < > 141*  --   --    HDL  --  65 64 60   < > 61  --   --    TRIG  --  111 85 133   < > 85  --   --    ALT  --  18 16 18   < > 30   < > 13   CR 0.75 0.76 0.76 0.76   < > 0.68   < > 0.69   GFRESTIMATED  --  79 75 75   < > 87   < > 86   GFRESTBLACK  --  >90 >90 >90  African American GFR Calc     < > >90   < > >90   POTASSIUM  --  3.8 4.0 3.6   < > 3.6   < > 4.1   TSH  --   --   --   --   --  2.57  --  3.97    < > = values in this interval not displayed.      BP Readings from Last 3 Encounters:   01/09/20 120/70   10/07/19 128/74   08/09/18 118/76    Wt Readings from Last 3 Encounters:   01/09/20 124.2 kg (273 lb 14.4 oz)   10/07/19 121.1 kg (266 lb 14.4 oz)   08/09/18 116.1 kg (256 lb)            Reviewed and updated as needed this visit by Provider  Tobacco  Allergies  Meds  Problems  Med Hx  Surg Hx  Fam Hx         Review of Systems   ROS COMP: CONSTITUTIONAL: NEGATIVE for fever, chills, change in weight  ENT/MOUTH: NEGATIVE for ear, mouth and throat problems  RESP: NEGATIVE for significant cough or SOB  CV: NEGATIVE for chest pain, palpitations or peripheral edema  GI: NEGATIVE for nausea, abdominal pain, heartburn, or change in bowel habits  MUSCULOSKELETAL: ambulatory; active  NEURO: NEGATIVE for weakness, dizziness or paresthesias  ENDOCRINE: reviewed DEXA; bone health reviewed  HEME/ALLERGY/IMMUNE: NEGATIVE for bleeding problems  PSYCHIATRIC: NEGATIVE for changes in mood or affect      Objective    /70   Pulse 83  "  Temp 98  F (36.7  C) (Oral)   Resp 19   Ht 1.651 m (5' 5\")   Wt 124.2 kg (273 lb 14.4 oz)   SpO2 98%   BMI 45.58 kg/m    Body mass index is 45.58 kg/m .  Physical Exam   GENERAL: healthy, alert and no distress  NECK: no adenopathy, no asymmetry, masses, or scars and thyroid normal to palpation  RESP: lungs clear to auscultation - no rales, rhonchi or wheezes  CV: regular rates and rhythm, normal S1 S2, no S3 or S4, peripheral pulses strong and no peripheral edema  ABDOMEN: soft, nontender and bowel sounds normal  MS: no gross musculoskeletal defects noted, no edema  NEURO: Normal strength and tone, sensory exam grossly normal and mentation intact  PSYCH: mentation appears normal, affect normal/bright    Diagnostic Test Results:  Labs reviewed in Epic        Assessment & Plan     (M85.851,  M85.852) Osteopenia of necks of both femurs  (primary encounter diagnosis)  Comment: reviewed DEXA- discussed osteopenia and opportunities to improve bone dnesity  Plan: alendronate (FOSAMAX) 70 MG tablet, vitamin D3         (CHOLECALCIFEROL) 2000 units (50 mcg) tablet        Keep active, healthy diet, dietary calcium and vitamin D3.    (K51.00) Ulcerative pancolitis without complication (H)  Comment: ulcerative colitis;   Plan: vitamin D3, regular exercise, healthy diet    (E66.01) Morbid obesity, unspecified obesity type (H)  Comment: Body mass index is 45.58 kg/m .   Plan: weight loss; weight bearing exercise       reassess DEXA in 2 years      Luz Elena Paul MD  Internal Medicine   [facility] [place]    30 minutes is spent with patient, over 50% of that time spent providing counselling, discussing and reviewing meds and potential side effects.        "

## 2020-01-09 NOTE — PATIENT INSTRUCTIONS
Osteopenia:    Alendronate:  Take once per week on an empty stomach, with 8 oz of water and not eat or  lay down for 30-40 minutes

## 2020-03-02 ENCOUNTER — OFFICE VISIT (OUTPATIENT)
Dept: FAMILY MEDICINE | Facility: CLINIC | Age: 70
End: 2020-03-02
Payer: COMMERCIAL

## 2020-03-02 VITALS
SYSTOLIC BLOOD PRESSURE: 154 MMHG | HEART RATE: 78 BPM | TEMPERATURE: 97.4 F | RESPIRATION RATE: 26 BRPM | HEIGHT: 64 IN | OXYGEN SATURATION: 97 % | DIASTOLIC BLOOD PRESSURE: 86 MMHG | BODY MASS INDEX: 46.51 KG/M2 | WEIGHT: 272.4 LBS

## 2020-03-02 DIAGNOSIS — I10 ESSENTIAL HYPERTENSION: ICD-10-CM

## 2020-03-02 DIAGNOSIS — B37.2 CANDIDAL INTERTRIGO: Primary | ICD-10-CM

## 2020-03-02 PROCEDURE — 99213 OFFICE O/P EST LOW 20 MIN: CPT | Performed by: PHYSICIAN ASSISTANT

## 2020-03-02 RX ORDER — KETOCONAZOLE 20 MG/G
CREAM TOPICAL DAILY
Qty: 60 G | Refills: 1 | Status: SHIPPED | OUTPATIENT
Start: 2020-03-02 | End: 2020-08-13

## 2020-03-02 ASSESSMENT — MIFFLIN-ST. JEOR: SCORE: 1740.6

## 2020-03-02 NOTE — PROGRESS NOTES
Subjective     Jaylyn Gonzalez is a 69 year old female who presents to clinic today for the following health issues:    HPI   Rash  Onset: Ongoing for 5 Days    Description:   Location: Left Axilla  Character: red circular area  Itching (Pruritis): no     Progression of Symptoms:  Slightly improved from when she first noticed it but redness persists    Accompanying Signs & Symptoms:  Fever: no   Body aches or joint pain: no   Sore throat symptoms: no   Recent cold symptoms: YES- Has a cold    History:   Previous similar rash: no     Precipitating factors:   Exposure to similar rash: no   New exposures: None   Recent travel: no     Alleviating factors:  See Below  Therapies Tried and outcome: using antibacterial soap to keep it clean    Red rash under left axilla for past 5 days. Has been using antibacterial soap with some improvement. Rash is not spreading. Did have foul odor initially but that improved with washing. No itching. No drainage. No pain. Otherwise feeling well, no fever.    Patient Active Problem List   Diagnosis     UC (ulcerative colitis) (H)     HTN (hypertension)     Hyperlipidemia LDL goal <160     Advanced directives, counseling/discussion     Seborrheic keratosis     Vitamin D deficiency     Hiatal hernia     Paraumbilical hernia     Snoring     Obesity     Fatigue     Cystocele     Morbid obesity, unspecified obesity type (H)     Past Surgical History:   Procedure Laterality Date     COLONOSCOPY  11/7/2011    Procedure:COMBINED COLONOSCOPY, SINGLE BIOPSY/POLYPECTOMY BY BIOPSY; COLONOSCOPY  cold bx's; Surgeon:NOE DIAZ; Location:[facility]     ENDOSCOPIC RETROGRADE CHOLANGIOPANCREATOGRAM  7/30/2012    Procedure: ENDOSCOPIC RETROGRADE CHOLANGIOPANCREATOGRAM;  ENDOSCOPIC RETROGRADE CHOLANGIOPANCREATOGRAM ;  Surgeon: Noe Diaz MD;  Location: [facility]     GYN SURGERY       HYSTERECTOMY       hysterectomy total  1995    spot in uterus, cancerous, no follow up needed     LAPAROSCOPIC CHOLECYSTECTOMY   2012    Procedure: LAPAROSCOPIC CHOLECYSTECTOMY;  LAPAROSCOPIC CHOLECYSTECTOMY;  Surgeon: Viri Garcia MD;  Location: [facility]     LAPAROTOMY EXPLORATORY       MOUTH SURGERY         Social History     Tobacco Use     Smoking status: Never Smoker     Smokeless tobacco: Never Used   Substance Use Topics     Alcohol use: Yes     Comment: occ     Family History   Problem Relation Age of Onset     Parkinsonism Mother          in her 80's ,dementia      Neurologic Disorder Mother         parkinson     Family History Negative Father          from sepsis after hip surgery      Hypertension Father      Psychotic Disorder Brother         1-in group home -MR-64 yo old      Heart Failure Maternal Grandmother      Prostate Cancer Maternal Grandfather      Family History Negative Paternal Grandmother          from old age 103     Family History Negative Paternal Grandfather         gangrene, no diabetes     Breast Cancer No family hx of      Cancer - colorectal No family hx of          Current Outpatient Medications   Medication Sig Dispense Refill     alendronate (FOSAMAX) 70 MG tablet Take 1 tablet (70 mg) by mouth every 7 days Take on empty stomach, with 8 oz of water and not eat or  lay down for 30-40 minutes 12 tablet 3     Calcium Carbonate (CALCIUM 500 PO) Take  by mouth.       hydrochlorothiazide (HYDRODIURIL) 12.5 MG tablet Take 1 tablet (12.5 mg) by mouth daily 90 tablet 3     irbesartan (AVAPRO) 150 MG tablet Take 1 tablet (150 mg) by mouth daily 90 tablet 11     ketoconazole (NIZORAL) 2 % external cream Apply topically daily for 14 days 60 g 1     mesalamine (ASACOL) 400 MG EC tablet Take 2 tablets (800 mg) by mouth daily as needed , pt usually is taking 1 -  800 mg per day 180 tablet 3     vitamin D3 (CHOLECALCIFEROL) 2000 units (50 mcg) tablet Take 1 tablet (2,000 Units) by mouth daily 90 tablet 4     VITAMIN D3 1000 units tablet TAKE ONE TABLET BY MOUTH EVERY DAY 90 tablet 0     Allergies  "  Allergen Reactions     Penicillin G Rash     Sulfa Drugs        Reviewed and updated as needed this visit by Provider  Tobacco  Allergies  Meds  Problems  Med Hx  Surg Hx  Fam Hx         Review of Systems   ROS COMP: Constitutional, HEENT, cardiovascular, pulmonary, gi systems are negative, except as otherwise noted.      Objective    BP (!) 154/86   Pulse 78   Temp 97.4  F (36.3  C) (Oral)   Resp 26   Ht 1.626 m (5' 4\")   Wt 123.6 kg (272 lb 6.4 oz)   SpO2 97%   BMI 46.76 kg/m    Body mass index is 46.76 kg/m .  Physical Exam   GENERAL: healthy, alert and no distress  RESP: lungs clear to auscultation - no rales, rhonchi or wheezes  CV: regular rate and rhythm, normal S1 S2, no S3 or S4, no murmur, click or rub, no peripheral edema and peripheral pulses strong  SKIN: circular area of erythema in left axilla with sharp edges. No overlying warmth. No tenderness. No skin openings or drainage.    Diagnostic Test Results: None         Assessment & Plan     1. Candidal intertrigo  - ketoconazole (NIZORAL) 2 % external cream; Apply topically daily for 14 days  Dispense: 60 g; Refill: 1  Rash is consistent with candidal intertrigo, no signs of overlying infection. Recommend using topical cream to area of rash and about 1-2 cm past rash once daily for 2 weeks. Follow-up if worsening rash, signs of overlying infection, or other concerns.    2. Essential hypertension  History of HTN, BP elevated today. She forgot to take meds for the past few days but did take this morning. Discussed ways that she could try to help herself remember to take meds every day. Recommend follow-up for nurse BP recheck in 2 weeks.    Risks and benefits of treatment plan discussed. Patient and/or parent acknowledges and agrees with plan of care, all questions answered.      Return in about 2 weeks (around 3/16/2020) for Nurse BP Recheck.    Caprice Reddy PA-C  [facility]    "

## 2020-03-02 NOTE — PATIENT INSTRUCTIONS
Use topical cream to area of rash and about 1-2 cm past rash once daily for 2 weeks. Follow-up if worsening rash, signs of overlying infection, or other concerns.    Blood pressure was elevated today. Make sure you take your blood pressure medication every day. Follow-up in clinic in 2 weeks for nurse blood pressure recheck.

## 2020-06-18 ENCOUNTER — TRANSFERRED RECORDS (OUTPATIENT)
Dept: HEALTH INFORMATION MANAGEMENT | Facility: CLINIC | Age: 70
End: 2020-06-18

## 2020-07-16 DIAGNOSIS — I10 ESSENTIAL HYPERTENSION: ICD-10-CM

## 2020-07-16 RX ORDER — IRBESARTAN 150 MG/1
TABLET ORAL
Qty: 30 TABLET | Refills: 11
Start: 2020-07-16

## 2020-08-13 ENCOUNTER — VIRTUAL VISIT (OUTPATIENT)
Dept: FAMILY MEDICINE | Facility: CLINIC | Age: 70
End: 2020-08-13
Payer: COMMERCIAL

## 2020-08-13 DIAGNOSIS — I10 ESSENTIAL HYPERTENSION: ICD-10-CM

## 2020-08-13 DIAGNOSIS — R21 RASH AND NONSPECIFIC SKIN ERUPTION: Primary | ICD-10-CM

## 2020-08-13 PROCEDURE — 99213 OFFICE O/P EST LOW 20 MIN: CPT | Mod: TEL | Performed by: PHYSICIAN ASSISTANT

## 2020-08-13 RX ORDER — CETIRIZINE HYDROCHLORIDE 10 MG/1
10 TABLET ORAL 2 TIMES DAILY
Qty: 28 TABLET | Refills: 0 | Status: SHIPPED | OUTPATIENT
Start: 2020-08-13 | End: 2024-02-29

## 2020-08-13 NOTE — PROGRESS NOTES
"Jaylyn Gonzalez is a 69 year old female who is being evaluated via a billable telephone visit.      The patient has been notified of following:     \"This telephone visit will be conducted via a call between you and your physician/provider. We have found that certain health care needs can be provided without the need for a physical exam.  This service lets us provide the care you need with a short phone conversation.  If a prescription is necessary we can send it directly to your pharmacy.  If lab work is needed we can place an order for that and you can then stop by our lab to have the test done at a later time.    Telephone visits are billed at different rates depending on your insurance coverage. During this emergency period, for some insurers they may be billed the same as an in-person visit.  Please reach out to your insurance provider with any questions.    If during the course of the call the physician/provider feels a telephone visit is not appropriate, you will not be charged for this service.\"    Patient has given verbal consent for Telephone visit?  Yes    What phone number would you like to be contacted at? [phone]    How would you like to obtain your AVS? Mail a copy    Subjective     Jaylyn Gonzalez is a 69 year old female who presents via phone visit today for the following health issues:    HPI    Rash      Duration: 1 month    Description  Location: Started on chest, neck, below breasts and underarms  Itching: moderate    Intensity:  moderate    Accompanying signs and symptoms: None    History (similar episodes/previous evaluation): None    Precipitating or alleviating factors:  New exposures:  None  Recent travel: no      Therapies tried and outcome: calamine lotion and dial soap in the bath daily without relief      Jaylyn Gonzalez is a 69 year old female who presents today for telephone visit to discuss new rash for the past month  First noted them on her chest over the sternum; when they'd appear " "on the skin the appeared \"like a volcano\" with crusting on the top, then look like little blisters then dry out  Then she found them on the neck and moved below the breast  Found new ones in the left armpit yesterday  Non on the back or legs  Usually about half the size of a pea  They would be painful and can be itchy  Are usually red, but little redness other than the lesions  She denies feeling fever, no chills, no night sweats  No new medications, soaps or detergents, foods  No recent travel    Seen 3/2/2020 for intertrigo and did get improvement quickly  This rash was different      Patient Active Problem List   Diagnosis     UC (ulcerative colitis) (H)     HTN (hypertension)     Hyperlipidemia LDL goal <160     Advanced directives, counseling/discussion     Seborrheic keratosis     Vitamin D deficiency     Hiatal hernia     Paraumbilical hernia     Snoring     Obesity     Fatigue     Cystocele     Morbid obesity, unspecified obesity type (H)     Past Surgical History:   Procedure Laterality Date     COLONOSCOPY  11/7/2011    Procedure:COMBINED COLONOSCOPY, SINGLE BIOPSY/POLYPECTOMY BY BIOPSY; COLONOSCOPY  cold bx's; Surgeon:NOE DIAZ; Location:[facility]     ENDOSCOPIC RETROGRADE CHOLANGIOPANCREATOGRAM  7/30/2012    Procedure: ENDOSCOPIC RETROGRADE CHOLANGIOPANCREATOGRAM;  ENDOSCOPIC RETROGRADE CHOLANGIOPANCREATOGRAM ;  Surgeon: Noe Diaz MD;  Location: [facility]     GYN SURGERY       HYSTERECTOMY       hysterectomy total  1995    spot in uterus, cancerous, no follow up needed     LAPAROSCOPIC CHOLECYSTECTOMY  8/7/2012    Procedure: LAPAROSCOPIC CHOLECYSTECTOMY;  LAPAROSCOPIC CHOLECYSTECTOMY;  Surgeon: Viri Garcia MD;  Location: [facility]     LAPAROTOMY EXPLORATORY       MOUTH SURGERY         Social History     Tobacco Use     Smoking status: Never Smoker     Smokeless tobacco: Never Used   Substance Use Topics     Alcohol use: Yes     Comment: occ     Family History   Problem Relation Age of Onset "     Parkinsonism Mother          in her 80's ,dementia      Neurologic Disorder Mother         parkinson     Family History Negative Father          from sepsis after hip surgery      Hypertension Father      Psychotic Disorder Brother         1-in group home -MR-64 yo old      Heart Failure Maternal Grandmother      Prostate Cancer Maternal Grandfather      Family History Negative Paternal Grandmother          from old age 103     Family History Negative Paternal Grandfather         gangrene, no diabetes     Breast Cancer No family hx of      Cancer - colorectal No family hx of            Reviewed and updated as needed this visit by Provider         Review of Systems   Constitutional, HEENT, cardiovascular, pulmonary, gi and gu systems are negative, except as otherwise noted.       Objective          Vitals:  No vitals were obtained today due to virtual visit.    healthy, alert and no distress  PSYCH: Alert and oriented times 3; coherent speech, normal   rate and volume, able to articulate logical thoughts, able   to abstract reason, no tangential thoughts, no hallucinations   or delusions  Her affect is normal  RESP: No cough, no audible wheezing, able to talk in full sentences  Remainder of exam unable to be completed due to telephone visits    Diagnostic Test Results:  none         Assessment/Plan:    Assessment & Plan     1. Rash and nonspecific skin eruption  Unclear etiology, especially without being able to see. Rash is pruritic. Possibly some smaller pustules/blisters? No fever, chills, night sweats. No new exposures/meds/foods etc. Crossing midline, unlikely zoster. Denies any similar hx. Consider idiopathic urticaria vs folliculitis vs other? Some increased itching at night but scabies risk low. Trial below for 2 weeks and if not improvement will need in clinic appt for eval. See #2  - cetirizine (ZYRTEC) 10 MG tablet; Take 1 tablet (10 mg) by mouth 2 times daily  Dispense: 28 tablet; Refill:  "0    2. Essential hypertension  I note this was elevated at her last visit. Needs recheck. Historically controlled.       BMI:   Estimated body mass index is 46.76 kg/m  as calculated from the following:    Height as of 3/2/20: 1.626 m (5' 4\").    Weight as of 3/2/20: 123.6 kg (272 lb 6.4 oz).         Return in about 2 weeks (around 8/27/2020) for recheck if symptoms are not improving..    Toni Murray PA-C  [facility]    Phone call duration:  15 minutes              "

## 2020-10-26 DIAGNOSIS — I10 ESSENTIAL HYPERTENSION: ICD-10-CM

## 2020-10-26 NOTE — TELEPHONE ENCOUNTER
Routing refill request to provider for review/approval because:  Failed protocol for hydrochlorothiazide and irbesartan - bp elevated, due for potassium and sodium    Will forward to the station please try to help the pt schedule an appt for bp recheck appt with provider and lab appt.  Thanks!

## 2020-10-27 RX ORDER — HYDROCHLOROTHIAZIDE 12.5 MG/1
TABLET ORAL
Qty: 30 TABLET | Refills: 0 | Status: SHIPPED | OUTPATIENT
Start: 2020-10-27 | End: 2020-12-01

## 2020-10-27 RX ORDER — IRBESARTAN 150 MG/1
TABLET ORAL
Qty: 30 TABLET | Refills: 0 | Status: SHIPPED | OUTPATIENT
Start: 2020-10-27 | End: 2020-12-01

## 2020-11-05 ENCOUNTER — TRANSFERRED RECORDS (OUTPATIENT)
Dept: HEALTH INFORMATION MANAGEMENT | Facility: CLINIC | Age: 70
End: 2020-11-05

## 2020-11-05 LAB — CREAT SERPL-MCNC: 0.53 MG/DL (ref 0.6–1.02)

## 2020-12-01 ENCOUNTER — OFFICE VISIT (OUTPATIENT)
Dept: FAMILY MEDICINE | Facility: CLINIC | Age: 70
End: 2020-12-01
Payer: COMMERCIAL

## 2020-12-01 VITALS
SYSTOLIC BLOOD PRESSURE: 122 MMHG | TEMPERATURE: 97.8 F | RESPIRATION RATE: 19 BRPM | BODY MASS INDEX: 45.68 KG/M2 | OXYGEN SATURATION: 98 % | HEIGHT: 64 IN | DIASTOLIC BLOOD PRESSURE: 70 MMHG | HEART RATE: 78 BPM | WEIGHT: 267.6 LBS

## 2020-12-01 DIAGNOSIS — M85.851 OSTEOPENIA OF NECKS OF BOTH FEMURS: ICD-10-CM

## 2020-12-01 DIAGNOSIS — E55.9 VITAMIN D DEFICIENCY: ICD-10-CM

## 2020-12-01 DIAGNOSIS — E78.00 ELEVATED LDL CHOLESTEROL LEVEL: ICD-10-CM

## 2020-12-01 DIAGNOSIS — Z00.00 ENCOUNTER FOR MEDICARE ANNUAL WELLNESS EXAM: Primary | ICD-10-CM

## 2020-12-01 DIAGNOSIS — K51.00 ULCERATIVE PANCOLITIS WITHOUT COMPLICATION (H): ICD-10-CM

## 2020-12-01 DIAGNOSIS — R73.09 ELEVATED GLUCOSE: ICD-10-CM

## 2020-12-01 DIAGNOSIS — M85.852 OSTEOPENIA OF NECKS OF BOTH FEMURS: ICD-10-CM

## 2020-12-01 DIAGNOSIS — E78.5 HYPERLIPIDEMIA LDL GOAL <160: ICD-10-CM

## 2020-12-01 DIAGNOSIS — I10 ESSENTIAL HYPERTENSION: ICD-10-CM

## 2020-12-01 LAB
ERYTHROCYTE [DISTWIDTH] IN BLOOD BY AUTOMATED COUNT: 13.9 % (ref 10–15)
HBA1C MFR BLD: 6 % (ref 0–5.6)
HCT VFR BLD AUTO: 41.5 % (ref 35–47)
HGB BLD-MCNC: 13.2 G/DL (ref 11.7–15.7)
MCH RBC QN AUTO: 30.8 PG (ref 26.5–33)
MCHC RBC AUTO-ENTMCNC: 31.8 G/DL (ref 31.5–36.5)
MCV RBC AUTO: 97 FL (ref 78–100)
PLATELET # BLD AUTO: 414 10E9/L (ref 150–450)
RBC # BLD AUTO: 4.29 10E12/L (ref 3.8–5.2)
WBC # BLD AUTO: 11.2 10E9/L (ref 4–11)

## 2020-12-01 PROCEDURE — G0439 PPPS, SUBSEQ VISIT: HCPCS | Performed by: INTERNAL MEDICINE

## 2020-12-01 PROCEDURE — 80061 LIPID PANEL: CPT | Performed by: INTERNAL MEDICINE

## 2020-12-01 PROCEDURE — 85027 COMPLETE CBC AUTOMATED: CPT | Performed by: INTERNAL MEDICINE

## 2020-12-01 PROCEDURE — 99213 OFFICE O/P EST LOW 20 MIN: CPT | Mod: 25 | Performed by: INTERNAL MEDICINE

## 2020-12-01 PROCEDURE — 36415 COLL VENOUS BLD VENIPUNCTURE: CPT | Performed by: INTERNAL MEDICINE

## 2020-12-01 PROCEDURE — 83036 HEMOGLOBIN GLYCOSYLATED A1C: CPT | Performed by: INTERNAL MEDICINE

## 2020-12-01 PROCEDURE — 80053 COMPREHEN METABOLIC PANEL: CPT | Performed by: INTERNAL MEDICINE

## 2020-12-01 RX ORDER — HYDROCHLOROTHIAZIDE 12.5 MG/1
12.5 TABLET ORAL DAILY
Qty: 30 TABLET | Refills: 11 | Status: SHIPPED | OUTPATIENT
Start: 2020-12-01 | End: 2021-12-03

## 2020-12-01 RX ORDER — ALENDRONATE SODIUM 70 MG/1
70 TABLET ORAL
Qty: 12 TABLET | Refills: 4 | Status: SHIPPED | OUTPATIENT
Start: 2020-12-01 | End: 2023-01-13

## 2020-12-01 RX ORDER — IRBESARTAN 150 MG/1
150 TABLET ORAL DAILY
Qty: 30 TABLET | Refills: 11 | Status: SHIPPED | OUTPATIENT
Start: 2020-12-01 | End: 2021-12-03

## 2020-12-01 ASSESSMENT — MIFFLIN-ST. JEOR: SCORE: 1713.83

## 2020-12-01 NOTE — PROGRESS NOTES
"Subjective     Jaylyn Gonzalez is a 70 year old female who presents to clinic today for the following health issues:    HPI         Hypertension Follow-up      Do you check your blood pressure regularly outside of the clinic? No     Are you following a low salt diet? Yes    Are your blood pressures ever more than 140 on the top number (systolic) OR more   than 90 on the bottom number (diastolic), for example 140/90? Are not checking      How many servings of fruits and vegetables do you eat daily?  4 or more    On average, how many sweetened beverages do you drink each day (Examples: soda, juice, sweet tea, etc.  Do NOT count diet or artificially sweetened beverages)?   2    How many days per week do you exercise enough to make your heart beat faster? 3 or less    How many minutes a day do you exercise enough to make your heart beat faster? 10 - 19    How many days per week do you miss taking your medication? Has been off Alendronate for several months     Annual Wellness Visit    Patient has been advised of split billing requirements and indicates understanding: Yes     Are you in the first 12 months of your Medicare Part B coverage?  No    Physical Health:    In general, how would you rate your overall physical health? good    Outside of work, how many days during the week do you exercise?2-3 days/week    Outside of work, approximately how many minutes a day do you exercise?15-30 minutes    If you drink alcohol do you typically have >3 drinks per day or >7 drinks per week? Not Applicable    Do you usually eat at least 4 servings of fruit and vegetables a day, include whole grains & fiber and avoid regularly eating high fat or \"junk\" foods? Yes    Do you have any problems taking medications regularly? YES   If she ran out was forgetting to fill     Do you have any side effects from medications? none    Needs assistance for the following daily activities: no assistance needed    Which of the following safety concerns " are present in your home?  none identified     Hearing impairment: No    In the past 6 months, have you been bothered by leaking of urine? no    Mental Health:    In general, how would you rate your overall mental or emotional health? fair  PHQ-2 Score: 0    Do you feel safe in your environment? Yes    Have you ever done Advance Care Planning? (For example, a Health Directive, POLST, or a discussion with a medical provider or your loved ones about your wishes)? No, advance care planning information given to patient to review.  Patient declined advance care planning discussion at this time.    Fall risk:  Fallen 2 or more times in the past year?: No  Any fall with injury in the past year?: No    Cognitive Screenin) Repeat 3 items (Leader, Season, Table)    2) Clock draw: NORMAL  3) 3 item recall: Recalls 3 objects  Results: 3 items recalled: COGNITIVE IMPAIRMENT LESS LIKELY    Mini-CogTM Copyright S Chel. Licensed by the author for use in [facility]; reprinted with permission (soob@.[facility]). All rights reserved.      Do you have sleep apnea, excessive snoring or daytime drowsiness?: no    Current providers sharing in care for this patient include:     Patient Care Team:  Luz Elena Paul MD as PCP - General (Internal Medicine)  Luz Elena Paul MD as Assigned PCP    Patient has been advised of split billing requirements and indicates understanding: Yes    Review of Systems   CONSTITUTIONAL: NEGATIVE for fever, chills, change in weight  INTEGUMENTARY/SKIN: NEGATIVE for worrisome rashes, moles or lesions  EYES: NEGATIVE for vision changes or irritation  ENT/MOUTH: NEGATIVE for ear, mouth and throat problems  RESP: NEGATIVE for significant cough or SOB  BREAST: NEGATIVE for masses, tenderness or discharge  CV: Blood pressure medications well tolerated. NEGATIVE for chest pain, palpitations or peripheral edema  GI: NEGATIVE for nausea, abdominal pain, heartburn, or change in bowel  "habits  : NEGATIVE for frequency, dysuria, or hematuria  MUSCULOSKELETAL: left leg shorter than right related to child mullins trauma,. NEGATIVE for significant arthralgias or myalgia  NEURO: NEGATIVE for weakness, dizziness or paresthesias  ENDOCRINE: Osteopenia- previously on Alendronate;  Was having challenges taking medications weekly as  Prescribed; spouse had many health challenges and has since moved to the [place] with additional care needs. NEGATIVE for temperature intolerance, skin/hair changes  HEME: NEGATIVE for bleeding problems  PSYCHIATRIC: NEGATIVE for changes in mood or affect      Objective    /70   Pulse 78   Temp 97.8  F (36.6  C) (Oral)   Resp 19   Ht 1.626 m (5' 4\")   Wt 121.4 kg (267 lb 9.6 oz)   SpO2 98%   BMI 45.93 kg/m    Body mass index is 45.93 kg/m .  Physical Exam   GENERAL: healthy, alert and no distress  EYES: Eyes grossly normal to inspection  HENT: ear canals and TM's normal, nose and mouth without ulcers or lesions  NECK: no adenopathy, no asymmetry, masses, or scars and thyroid normal to palpation  RESP: lungs clear to auscultation - no rales, rhonchi or wheezes  CV: regular rate and rhythm, normal S1 S2, no S3 or S4, no murmur, click or rub, no peripheral edema and peripheral pulses strong  ABDOMEN: soft, nontender, no hepatosplenomegaly, no masses and bowel sounds normal  MS: extremities normal- no gross deformities noted  NEURO: Normal strength and tone, sensory exam grossly normal, mentation intact, gait normal including heel/toe/tandem walking and Romberg normal  PSYCH: mentation appears normal and affect normal/bright          Assessment & Plan     (Z00.00) Encounter for Medicare annual wellness exam  (primary encounter diagnosis)  Comment: HEALTH CARE MAINTENANCE reviewed  Plan:     (I10) Essential hypertension  Comment: med availablilty; changed Irbesartan and diuretic separate; preferred by pt  Plan: irbesartan (AVAPRO) 150 MG tablet,         " hydrochlorothiazide (HYDRODIURIL) 12.5 MG         tablet, Comprehensive metabolic panel, CBC with        platelets          (M85.851,  M85.852) Osteopenia of necks of both femurs  Comment: reviewed dosing and administration of bisphosphonate therapy  Plan: alendronate (FOSAMAX) 70 MG tablet        Vit D3, Calcium; regular exercise encouraged    (E78.5) Hyperlipidemia LDL goal <160  Comment:   Lab Results   Component Value Date     09/30/2019     08/02/2018      The 10-year ASCVD risk score (Juana ARRINGTON Jr., et al., 2013) is: 12.9%    Values used to calculate the score:      Age: 70 years      Sex: Female      Is Non- : No      Diabetic: No      Tobacco smoker: No      Systolic Blood Pressure: 122 mmHg      Is BP treated: Yes      HDL Cholesterol: 65 mg/dL      Total Cholesterol: 230 mg/dL    Plan: will reassess labs and consider treatment options in future if warranted.     (E55.9) Vitamin D deficiency  Comment: vit D3 daily   Plan:     (K51.00) Ulcerative pancolitis without complication (H)  Comment: sees MELISSA, scheduled for 12/21/20  Mesalamine taken on a regular basis;   Plan: monitor by MELISSA    (E78.00) Elevated LDL cholesterol level  Comment:   Lab Results   Component Value Date     09/30/2019     08/02/2018      The 10-year ASCVD risk score (Juana ARRINGTON Jr., et al., 2013) is: 12.9%    Values used to calculate the score:      Age: 70 years      Sex: Female      Is Non- : No      Diabetic: No      Tobacco smoker: No      Systolic Blood Pressure: 122 mmHg      Is BP treated: Yes      HDL Cholesterol: 65 mg/dL      Total Cholesterol: 230 mg/dL    Plan: will reassess labs and consider treatment options in future if warranted.   Lipid panel reflex to direct LDL Non-fasting          (R73.09) Elevated glucose  Comment:   Lab Results   Component Value Date    GLC 99 09/30/2019    GLC 96 08/02/2018     No hx of diabetes  Plan: Hemoglobin A1c             "  BMI:   Estimated body mass index is 45.93 kg/m  as calculated from the following:    Height as of this encounter: 1.626 m (5' 4\").    Weight as of this encounter: 121.4 kg (267 lb 9.6 oz).   Weight management plan: Discussed healthy diet and exercise guidelines         Work on weight loss  Regular exercise    Return in about 53 weeks (around 12/7/2021) for Annual Wellness Visit.    Luz Elena Paul MD  Internal Medicine   [facility]  15 minutes in addition to HEALTH CARE MAINTENANCE are spent with patient, over 50% of that time spent providing counselling, discussing and reviewing medical conditions/concerns, meds and potential side effects.     "

## 2020-12-01 NOTE — PATIENT INSTRUCTIONS
Patient Education   Personalized Prevention Plan  You are due for the preventive services outlined below.  Your care team is available to assist you in scheduling these services.  If you have already completed any of these items, please share that information with your care team to update in your medical record.  Health Maintenance Due   Topic Date Due     Mammogram  08/16/2020     FALL RISK ASSESSMENT  10/07/2020

## 2020-12-02 LAB
ALBUMIN SERPL-MCNC: 3.4 G/DL (ref 3.4–5)
ALP SERPL-CCNC: 86 U/L (ref 40–150)
ALT SERPL W P-5'-P-CCNC: 20 U/L (ref 0–50)
ANION GAP SERPL CALCULATED.3IONS-SCNC: 7 MMOL/L (ref 3–14)
AST SERPL W P-5'-P-CCNC: 17 U/L (ref 0–45)
BILIRUB SERPL-MCNC: 0.7 MG/DL (ref 0.2–1.3)
BUN SERPL-MCNC: 13 MG/DL (ref 7–30)
CALCIUM SERPL-MCNC: 9.3 MG/DL (ref 8.5–10.1)
CHLORIDE SERPL-SCNC: 106 MMOL/L (ref 94–109)
CHOLEST SERPL-MCNC: 253 MG/DL
CO2 SERPL-SCNC: 27 MMOL/L (ref 20–32)
CREAT SERPL-MCNC: 0.7 MG/DL (ref 0.52–1.04)
GFR SERPL CREATININE-BSD FRML MDRD: 87 ML/MIN/{1.73_M2}
GLUCOSE SERPL-MCNC: 100 MG/DL (ref 70–99)
HDLC SERPL-MCNC: 69 MG/DL
LDLC SERPL CALC-MCNC: 163 MG/DL
NONHDLC SERPL-MCNC: 184 MG/DL
POTASSIUM SERPL-SCNC: 3.8 MMOL/L (ref 3.4–5.3)
PROT SERPL-MCNC: 7.6 G/DL (ref 6.8–8.8)
SODIUM SERPL-SCNC: 139 MMOL/L (ref 133–144)
TRIGL SERPL-MCNC: 104 MG/DL

## 2021-01-08 NOTE — RESULT ENCOUNTER NOTE
DINH  Informed patient of provider message. Patient would rather work on eliminating sweets and more exercise.    Marie Caban RN

## 2021-02-01 ENCOUNTER — TRANSFERRED RECORDS (OUTPATIENT)
Dept: HEALTH INFORMATION MANAGEMENT | Facility: CLINIC | Age: 71
End: 2021-02-01

## 2021-05-12 ENCOUNTER — TELEPHONE (OUTPATIENT)
Dept: FAMILY MEDICINE | Facility: CLINIC | Age: 71
End: 2021-05-12

## 2021-05-12 NOTE — TELEPHONE ENCOUNTER
Patient Quality Outreach      Summary:    Patient has the following on her problem list/HM:   Hypertension   Last three blood pressure readings:  BP Readings from Last 3 Encounters:   12/01/20 122/70   03/02/20 (!) 154/86   01/09/20 120/70     Blood pressure: Passed    HTN Guidelines:  ? 139/89     Patient is due/failing the following:   Breast Cancer Screening - Mammogram    Type of outreach:    Phone, spoke to patient/parent. patient stated that she will call and set up the mammogram appointment herself.      Questions for provider review:    None                                                                                                                                     Lisa Magill, CMA       Chart routed to Provider.

## 2021-09-08 ENCOUNTER — TRANSFERRED RECORDS (OUTPATIENT)
Dept: HEALTH INFORMATION MANAGEMENT | Facility: CLINIC | Age: 71
End: 2021-09-08

## 2021-10-21 ENCOUNTER — TRANSFERRED RECORDS (OUTPATIENT)
Dept: HEALTH INFORMATION MANAGEMENT | Facility: CLINIC | Age: 71
End: 2021-10-21
Payer: COMMERCIAL

## 2021-10-21 LAB
CREATININE (EXTERNAL): 0.71 MG/DL (ref 0.57–1)
GFR ESTIMATED (EXTERNAL): 85 ML/MIN/1.73
GFR ESTIMATED (IF AFRICAN AMERICAN) (EXTERNAL): 98 ML/MIN/1.73

## 2021-11-08 ENCOUNTER — DOCUMENTATION ONLY (OUTPATIENT)
Dept: LAB | Facility: CLINIC | Age: 71
End: 2021-11-08
Payer: COMMERCIAL

## 2021-11-08 DIAGNOSIS — R73.09 ELEVATED GLUCOSE: ICD-10-CM

## 2021-11-08 DIAGNOSIS — I10 PRIMARY HYPERTENSION: ICD-10-CM

## 2021-11-08 DIAGNOSIS — M85.80 OSTEOPENIA, UNSPECIFIED LOCATION: ICD-10-CM

## 2021-11-08 DIAGNOSIS — E78.5 HYPERLIPIDEMIA LDL GOAL <160: Primary | ICD-10-CM

## 2021-11-08 NOTE — PROGRESS NOTES
Jaylyn Gonzalez has an upcoming lab appointment:    Future Appointments   Date Time Provider Department Center   11/23/2021  1:00 PM RM LAB [facility]   11/24/2021  2:15 PM [facility]   12/3/2021 11:00 AM Samantha Kwan, APRN CNP [facility]     Patient is scheduled for the following lab(s): labs before wellness visit    There is no order available. Please review and place either future orders or HMPO (Review of Health Maintenance Protocol Orders), as appropriate.    Health Maintenance Due   Topic     ANNUAL REVIEW OF HM ORDERS      Edith Rubi, JOSET

## 2021-11-23 ENCOUNTER — LAB (OUTPATIENT)
Dept: LAB | Facility: CLINIC | Age: 71
End: 2021-11-23
Payer: COMMERCIAL

## 2021-11-23 DIAGNOSIS — R73.09 ELEVATED GLUCOSE: ICD-10-CM

## 2021-11-23 DIAGNOSIS — I10 PRIMARY HYPERTENSION: ICD-10-CM

## 2021-11-23 DIAGNOSIS — E78.5 HYPERLIPIDEMIA LDL GOAL <160: ICD-10-CM

## 2021-11-23 DIAGNOSIS — M85.80 OSTEOPENIA, UNSPECIFIED LOCATION: ICD-10-CM

## 2021-11-23 LAB — HBA1C MFR BLD: 6.1 % (ref 0–5.6)

## 2021-11-23 PROCEDURE — 82306 VITAMIN D 25 HYDROXY: CPT

## 2021-11-23 PROCEDURE — 80053 COMPREHEN METABOLIC PANEL: CPT

## 2021-11-23 PROCEDURE — 36415 COLL VENOUS BLD VENIPUNCTURE: CPT

## 2021-11-23 PROCEDURE — 80061 LIPID PANEL: CPT

## 2021-11-23 PROCEDURE — 83036 HEMOGLOBIN GLYCOSYLATED A1C: CPT

## 2021-11-24 ENCOUNTER — HOSPITAL ENCOUNTER (OUTPATIENT)
Dept: MAMMOGRAPHY | Facility: CLINIC | Age: 71
Discharge: HOME | End: 2021-11-24
Attending: INTERNAL MEDICINE | Admitting: INTERNAL MEDICINE
Payer: COMMERCIAL

## 2021-11-24 DIAGNOSIS — Z12.31 VISIT FOR SCREENING MAMMOGRAM: ICD-10-CM

## 2021-11-24 LAB
ALBUMIN SERPL-MCNC: 3.3 G/DL (ref 3.4–5)
ALP SERPL-CCNC: 78 U/L (ref 40–150)
ALT SERPL W P-5'-P-CCNC: 22 U/L (ref 0–50)
ANION GAP SERPL CALCULATED.3IONS-SCNC: 6 MMOL/L (ref 3–14)
AST SERPL W P-5'-P-CCNC: 16 U/L (ref 0–45)
BILIRUB SERPL-MCNC: 0.9 MG/DL (ref 0.2–1.3)
BUN SERPL-MCNC: 18 MG/DL (ref 7–30)
CALCIUM SERPL-MCNC: 9.1 MG/DL (ref 8.5–10.1)
CHLORIDE BLD-SCNC: 106 MMOL/L (ref 94–109)
CHOLEST SERPL-MCNC: 219 MG/DL
CO2 SERPL-SCNC: 27 MMOL/L (ref 20–32)
CREAT SERPL-MCNC: 0.68 MG/DL (ref 0.52–1.04)
DEPRECATED CALCIDIOL+CALCIFEROL SERPL-MC: 39 UG/L (ref 20–75)
FASTING STATUS PATIENT QL REPORTED: YES
GFR SERPL CREATININE-BSD FRML MDRD: 88 ML/MIN/1.73M2
GLUCOSE BLD-MCNC: 96 MG/DL (ref 70–99)
HDLC SERPL-MCNC: 70 MG/DL
LDLC SERPL CALC-MCNC: 135 MG/DL
NONHDLC SERPL-MCNC: 149 MG/DL
POTASSIUM BLD-SCNC: 4.1 MMOL/L (ref 3.4–5.3)
PROT SERPL-MCNC: 7.4 G/DL (ref 6.8–8.8)
SODIUM SERPL-SCNC: 139 MMOL/L (ref 133–144)
TRIGL SERPL-MCNC: 72 MG/DL

## 2021-11-24 PROCEDURE — 77067 SCR MAMMO BI INCL CAD: CPT

## 2021-12-03 ENCOUNTER — OFFICE VISIT (OUTPATIENT)
Dept: FAMILY MEDICINE | Facility: CLINIC | Age: 71
End: 2021-12-03
Payer: COMMERCIAL

## 2021-12-03 VITALS
RESPIRATION RATE: 15 BRPM | SYSTOLIC BLOOD PRESSURE: 120 MMHG | TEMPERATURE: 97.7 F | HEART RATE: 89 BPM | WEIGHT: 268.5 LBS | DIASTOLIC BLOOD PRESSURE: 70 MMHG | BODY MASS INDEX: 44.73 KG/M2 | HEIGHT: 65 IN | OXYGEN SATURATION: 98 %

## 2021-12-03 DIAGNOSIS — K51.00 ULCERATIVE PANCOLITIS WITHOUT COMPLICATION (H): ICD-10-CM

## 2021-12-03 DIAGNOSIS — E66.01 MORBID OBESITY, UNSPECIFIED OBESITY TYPE (H): ICD-10-CM

## 2021-12-03 DIAGNOSIS — M85.851 OSTEOPENIA OF NECKS OF BOTH FEMURS: ICD-10-CM

## 2021-12-03 DIAGNOSIS — H53.9 VISION CHANGES: ICD-10-CM

## 2021-12-03 DIAGNOSIS — M85.852 OSTEOPENIA OF NECKS OF BOTH FEMURS: ICD-10-CM

## 2021-12-03 DIAGNOSIS — Z23 HIGH PRIORITY FOR 2019 NOVEL CORONAVIRUS VACCINATION: ICD-10-CM

## 2021-12-03 DIAGNOSIS — E78.5 HYPERLIPIDEMIA LDL GOAL <130: ICD-10-CM

## 2021-12-03 DIAGNOSIS — I10 ESSENTIAL HYPERTENSION: ICD-10-CM

## 2021-12-03 DIAGNOSIS — R73.03 PREDIABETES: ICD-10-CM

## 2021-12-03 DIAGNOSIS — Z00.00 ENCOUNTER FOR MEDICARE ANNUAL WELLNESS EXAM: Primary | ICD-10-CM

## 2021-12-03 PROBLEM — Z86.0100 HISTORY OF COLONIC POLYPS: Status: ACTIVE | Noted: 2018-04-16

## 2021-12-03 PROCEDURE — G0008 ADMIN INFLUENZA VIRUS VAC: HCPCS | Performed by: NURSE PRACTITIONER

## 2021-12-03 PROCEDURE — 0004A COVID-19,PF,PFIZER (12+ YRS): CPT | Performed by: NURSE PRACTITIONER

## 2021-12-03 PROCEDURE — 91300 COVID-19,PF,PFIZER (12+ YRS): CPT | Performed by: NURSE PRACTITIONER

## 2021-12-03 PROCEDURE — 99397 PER PM REEVAL EST PAT 65+ YR: CPT | Mod: 25 | Performed by: NURSE PRACTITIONER

## 2021-12-03 PROCEDURE — 99213 OFFICE O/P EST LOW 20 MIN: CPT | Mod: 25 | Performed by: NURSE PRACTITIONER

## 2021-12-03 PROCEDURE — 90682 RIV4 VACC RECOMBINANT DNA IM: CPT | Performed by: NURSE PRACTITIONER

## 2021-12-03 RX ORDER — ALENDRONATE SODIUM 70 MG/1
70 TABLET ORAL
Qty: 12 TABLET | Refills: 4 | Status: CANCELLED | OUTPATIENT
Start: 2021-12-03

## 2021-12-03 RX ORDER — IRBESARTAN 150 MG/1
150 TABLET ORAL DAILY
Qty: 30 TABLET | Refills: 11 | Status: SHIPPED | OUTPATIENT
Start: 2021-12-03 | End: 2022-12-07

## 2021-12-03 RX ORDER — HYDROCHLOROTHIAZIDE 12.5 MG/1
12.5 TABLET ORAL DAILY
Qty: 30 TABLET | Refills: 11 | Status: SHIPPED | OUTPATIENT
Start: 2021-12-03 | End: 2022-12-16

## 2021-12-03 ASSESSMENT — ACTIVITIES OF DAILY LIVING (ADL): CURRENT_FUNCTION: NO ASSISTANCE NEEDED

## 2021-12-03 ASSESSMENT — MIFFLIN-ST. JEOR: SCORE: 1728.79

## 2021-12-03 NOTE — PROGRESS NOTES
"SUBJECTIVE:   Jaylyn Gonzalez is a 71 year old female who presents for Preventive Visit.    Patient has been advised of split billing requirements and indicates understanding: Yes   Are you in the first 12 months of your Medicare coverage?  No    Healthy Habits:    In general, how would you rate your overall health?  Good    Frequency of exercise:  1 day/week    Duration of exercise:  15-30 minutes    Do you usually eat at least 4 servings of fruit and vegetables a day, include whole grains    & fiber and avoid regularly eating high fat or \"junk\" foods?  Yes    Taking medications regularly:  Yes    Barriers to taking medications:  Problems remembering to take them    Medication side effects:  None    Ability to successfully perform activities of daily living:  No assistance needed    Home Safety:  No safety concerns identified    Hearing Impairment:  No hearing concerns    In the past 6 months, have you been bothered by leaking of urine? Yes (Fallen bladder- it's usually an issue only at night)    In general, how would you rate your overall mental or emotional health?  Fair      PHQ-2 Total Score:    Additional concerns today:  No    Do you feel safe in your environment? Yes    Have you ever done Advance Care Planning? (For example, a Health Directive, POLST, or a discussion with a medical provider or your loved ones about your wishes): No, advance care planning information given to patient to review.  Patient declined advance care planning discussion at this time.         Fall risk  Fallen 2 or more times in the past year?: No  Any fall with injury in the past year?: No    Cognitive Screening   1) Repeat 3 items (Leader, Season, Table)    2) Clock draw: NORMAL  3) 3 item recall: Recalls 3 objects  Results: 3 items recalled: COGNITIVE IMPAIRMENT LESS LIKELY    Mini-CogTM Copyright MARISABEL Milligan. Licensed by the author for use in [facility]; reprinted with permission (farzaneh@.[email or web address]). All rights reserved.      Do " you have sleep apnea, excessive snoring or daytime drowsiness?: In the afternoon she gets very tired    Reviewed and updated as needed this visit by clinical staff  Tobacco  Allergies  Meds  Problems  Med Hx  Surg Hx  Fam Hx         Reviewed and updated as needed this visit by Provider  Tobacco  Allergies  Meds  Problems  Med Hx  Surg Hx  Fam Hx        Social History     Tobacco Use     Smoking status: Never Smoker     Smokeless tobacco: Never Used   Substance Use Topics     Alcohol use: Yes     Comment: occ         Alcohol Use 10/7/2019   Prescreen: >3 drinks/day or >7 drinks/week? -   Prescreen: >3 drinks/day or >7 drinks/week? Not Applicable               Current providers sharing in care for this patient include:   Patient Care Team:  Luz Elena Paul MD as PCP - General (Internal Medicine)  Luz Elena Paul MD as Assigned PCP    The following health maintenance items are reviewed in Epic and correct as of today:  Health Maintenance Due   Topic Date Due     ANNUAL REVIEW OF HM ORDERS  Never done     COVID-19 Vaccine (3 - Pfizer risk 4-dose series) 03/10/2021     INFLUENZA VACCINE (1) 09/01/2021     Labs reviewed in EPIC  BP Readings from Last 3 Encounters:   12/03/21 120/70   12/01/20 122/70   03/02/20 (!) 154/86    Wt Readings from Last 3 Encounters:   12/03/21 121.8 kg (268 lb 8 oz)   12/01/20 121.4 kg (267 lb 9.6 oz)   03/02/20 123.6 kg (272 lb 6.4 oz)                  Current Outpatient Medications   Medication Sig Dispense Refill     alendronate (FOSAMAX) 70 MG tablet Take 1 tablet (70 mg) by mouth every 7 days Take on empty stomach, with 8 oz of water and not eat or  lay down for 30-40 minutes 12 tablet 4     Calcium Carbonate (CALCIUM 500 PO) Take  by mouth.       cetirizine (ZYRTEC) 10 MG tablet Take 1 tablet (10 mg) by mouth 2 times daily 28 tablet 0     hydrochlorothiazide (HYDRODIURIL) 12.5 MG tablet Take 1 tablet (12.5 mg) by mouth daily 30 tablet 11     irbesartan (AVAPRO)  "150 MG tablet Take 1 tablet (150 mg) by mouth daily 30 tablet 11     mesalamine (ASACOL) 400 MG EC tablet Take 2 tablets (800 mg) by mouth daily as needed , pt usually is taking 1 -  800 mg per day 180 tablet 3     Allergies   Allergen Reactions     Eggshell Membrane (Chicken) [Egg Shells]      Other reaction(s): Abdominal cramping     Latex Rash     Penicillin G Rash     Sulfa Drugs      Mammogram Screening: Mammogram Screening: Recommended mammography every 1-2 years with patient discussion and risk factor consideration      Review of Systems  Constitutional, HEENT, cardiovascular, pulmonary, GI, , musculoskeletal, neuro, skin, endocrine and psych systems are negative, except as otherwise noted.    OBJECTIVE:   /70 (BP Location: Right arm, Patient Position: Sitting, Cuff Size: Adult Large)   Pulse 89   Temp 97.7  F (36.5  C) (Oral)   Resp 15   Ht 1.651 m (5' 5\")   Wt 121.8 kg (268 lb 8 oz)   SpO2 98%   BMI 44.68 kg/m   Estimated body mass index is 44.68 kg/m  as calculated from the following:    Height as of this encounter: 1.651 m (5' 5\").    Weight as of this encounter: 121.8 kg (268 lb 8 oz).  Physical Exam  GENERAL: healthy, alert and no distress  EYES: Eyes grossly normal to inspection, PERRL and conjunctivae and sclerae normal  HENT: ear canals and TM's normal, nose and mouth without ulcers or lesions  NECK: no adenopathy, no asymmetry, masses, or scars and thyroid normal to palpation  RESP: lungs clear to auscultation - no rales, rhonchi or wheezes  BREAST: declines   CV: regular rate and rhythm, normal S1 S2, no S3 or S4, no murmur, click or rub, no peripheral edema and peripheral pulses strong  ABDOMEN: soft, nontender, no hepatosplenomegaly, no masses and bowel sounds normal  MS: no gross musculoskeletal defects noted, no edema  SKIN: no suspicious lesions or rashes  NEURO: Normal strength and tone, mentation intact and speech normal  PSYCH: mentation appears normal, affect " "normal/bright    Diagnostic Test Results:  Labs reviewed in Epic    ASSESSMENT / PLAN:   1. Encounter for Medicare annual wellness exam    2. Ulcerative pancolitis without complication (H)  Managed by [facility] continues to take mesalamine daily.  Denies symptoms.     3. Morbid obesity, unspecified obesity type (H)  Discussed diet.  In the last year has reduced red meat consumption.  Drinks 1 can of regular Coke a day.  Try to cut down to the smaller 7 oz cans.  Likes candy, but will try to cut back.  Gets out to \"big box\" stores for walking.  Losing weight will help prediabetes, BP and cholesterol.     4. Essential hypertension  Chronic, well controlled.  Continue medications.  Reviewed labs with her today.    - irbesartan (AVAPRO) 150 MG tablet; Take 1 tablet (150 mg) by mouth daily  Dispense: 30 tablet; Refill: 11  - hydrochlorothiazide (HYDRODIURIL) 12.5 MG tablet; Take 1 tablet (12.5 mg) by mouth daily  Dispense: 30 tablet; Refill: 11    5. Osteopenia of necks of both femurs  She reports she is not taking fosamax and hasn't for over a year.  Can't recall why she stopped this.  No hx of fractures.  Discussed updating Dexa; hesitant.  Consider repeating next year. Calcium + vitamin D supplement and encouraged her to continue with regular walking.     6. High priority for 2019 novel coronavirus vaccination  administered today.   - COVID-19,PF,PFIZER (12+ Yrs PURPLE LABEL)    7. Vision changes  Reports needing magnifying glasses in addition to cheaters.  Recommend eye exam.     8. Prediabetes  Discussed cutting back on sweets.  A1c stable from last year.  Will recheck next year.  If trending up consider medication.     9. Hyperlipidemia LDL goal <130  Reviewed labs and risk score with her today.  Cholesterol levels have improved over the last year.  She does report that since spouse moved to senior living she is eating less red meat.  Drinks whole milk; can switch to lower fat content.  Recheck next year.  " "      COUNSELING:  Reviewed preventive health counseling, as reflected in patient instructions       Regular exercise       Healthy diet/nutrition       Vision screening       Dental care       Immunizations    Vaccinated for: Influenza  And Covid-19           Osteoporosis prevention/bone health    Estimated body mass index is 44.68 kg/m  as calculated from the following:    Height as of this encounter: 1.651 m (5' 5\").    Weight as of this encounter: 121.8 kg (268 lb 8 oz).    Weight management plan: Discussed healthy diet and exercise guidelines    She reports that she has never smoked. She has never used smokeless tobacco.      Appropriate preventive services were discussed with this patient, including applicable screening as appropriate for cardiovascular disease, diabetes, osteopenia/osteoporosis, and glaucoma.  As appropriate for age/gender, discussed screening for colorectal cancer, prostate cancer, breast cancer, and cervical cancer. Checklist reviewing preventive services available has been given to the patient.    Reviewed patients plan of care and provided an AVS. The Basic Care Plan (routine screening as documented in Health Maintenance) for Jaylyn meets the Care Plan requirement. This Care Plan has been established and reviewed with the Patient.    Counseling Resources:  ATP IV Guidelines  Pooled Cohorts Equation Calculator  Breast Cancer Risk Calculator  Breast Cancer: Medication to Reduce Risk  FRAX Risk Assessment  ICSI Preventive Guidelines  Dietary Guidelines for Americans, 2010  USDA's MyPlate  ASA Prophylaxis  Lung CA Screening    [facility]    Identified Health Risks:  "

## 2021-12-03 NOTE — PATIENT INSTRUCTIONS
Patient Education   Personalized Prevention Plan  You are due for the preventive services outlined below.  Your care team is available to assist you in scheduling these services.  If you have already completed any of these items, please share that information with your care team to update in your medical record.  Health Maintenance Due   Topic Date Due     ANNUAL REVIEW OF HM ORDERS  Never done     PHQ-2  01/01/2021     COVID-19 Vaccine (3 - Pfizer risk 4-dose series) 03/10/2021     FALL RISK ASSESSMENT  12/01/2021     Annual Wellness Visit  12/01/2021

## 2022-09-19 ENCOUNTER — TRANSFERRED RECORDS (OUTPATIENT)
Dept: HEALTH INFORMATION MANAGEMENT | Facility: CLINIC | Age: 72
End: 2022-09-19

## 2022-09-26 ENCOUNTER — TRANSFERRED RECORDS (OUTPATIENT)
Dept: HEALTH INFORMATION MANAGEMENT | Facility: CLINIC | Age: 72
End: 2022-09-26

## 2022-09-26 LAB
ALT SERPL-CCNC: 14 IU/L (ref 0–32)
AST SERPL-CCNC: 14 IU/L (ref 0–40)
CREATININE (EXTERNAL): 0.86 MG/DL (ref 0.57–1)
GFR ESTIMATED (EXTERNAL): 72 ML/MIN/1.73
GLUCOSE (EXTERNAL): 102 MG/DL (ref 70–99)
POTASSIUM (EXTERNAL): 4.6 MMOL/L (ref 3.5–5.2)

## 2022-11-14 ENCOUNTER — TRANSFERRED RECORDS (OUTPATIENT)
Dept: HEALTH INFORMATION MANAGEMENT | Facility: CLINIC | Age: 72
End: 2022-11-14

## 2022-11-29 ENCOUNTER — HOSPITAL ENCOUNTER (OUTPATIENT)
Dept: MAMMOGRAPHY | Facility: CLINIC | Age: 72
Discharge: HOME | End: 2022-11-29
Attending: NURSE PRACTITIONER | Admitting: NURSE PRACTITIONER
Payer: COMMERCIAL

## 2022-11-29 DIAGNOSIS — Z12.31 VISIT FOR SCREENING MAMMOGRAM: ICD-10-CM

## 2022-11-29 PROCEDURE — 77067 SCR MAMMO BI INCL CAD: CPT

## 2022-12-05 DIAGNOSIS — I10 ESSENTIAL HYPERTENSION: ICD-10-CM

## 2022-12-07 RX ORDER — IRBESARTAN 150 MG/1
150 TABLET ORAL DAILY
Qty: 45 TABLET | Refills: 0 | Status: SHIPPED | OUTPATIENT
Start: 2022-12-07 | End: 2023-01-13

## 2022-12-07 NOTE — TELEPHONE ENCOUNTER
Routing refill request to provider for review/approval because:  Labs not current: Creatinine   Patient needs to be seen because it has been more than 1 year since last office visit.  Patient does not have a blood pressure reading on file in the past 12 months.     BP Readings from Last 3 Encounters:   12/03/21 120/70   12/01/20 122/70   03/02/20 (!) 154/86     Creatinine   Date Value Ref Range Status   11/23/2021 0.68 0.52 - 1.04 mg/dL Final   12/01/2020 0.70 0.52 - 1.04 mg/dL Curtis SHEA RN   Patient Advocate Liaison (PAL)  [facility] [place]

## 2022-12-16 DIAGNOSIS — I10 ESSENTIAL HYPERTENSION: ICD-10-CM

## 2022-12-16 NOTE — TELEPHONE ENCOUNTER
Routing refill request to provider for review/approval because:  Labs not current:  BMP  Failing bp    Nisha Ordoñez RN

## 2022-12-19 RX ORDER — HYDROCHLOROTHIAZIDE 12.5 MG/1
12.5 TABLET ORAL DAILY
Qty: 30 TABLET | Refills: 0 | Status: SHIPPED | OUTPATIENT
Start: 2022-12-19 | End: 2023-01-13

## 2022-12-19 NOTE — TELEPHONE ENCOUNTER
appt next month; refilled.  Please see if she can come in for nurse BP recheck or have it checked with pharmacy since it's been so long since we've checked her BP.     Samantha Kwan CNP

## 2022-12-20 NOTE — TELEPHONE ENCOUNTER
Pt will have bp check when she picks up rx     Vibha Mead      MD made aware. Stated to just do a random vanco trough before his 5th dose at 3am. NP made aware. Stated to just do a random vanco trough before his 5th dose at 3am.

## 2022-12-22 ENCOUNTER — ALLIED HEALTH/NURSE VISIT (OUTPATIENT)
Dept: FAMILY MEDICINE | Facility: CLINIC | Age: 72
End: 2022-12-22
Payer: COMMERCIAL

## 2022-12-22 VITALS — DIASTOLIC BLOOD PRESSURE: 86 MMHG | SYSTOLIC BLOOD PRESSURE: 142 MMHG

## 2022-12-22 DIAGNOSIS — Z01.30 BP CHECK: Primary | ICD-10-CM

## 2022-12-22 PROCEDURE — 99207 PR NO CHARGE NURSE ONLY: CPT | Performed by: NURSE PRACTITIONER

## 2022-12-22 NOTE — PROGRESS NOTES
Jaylyn Gonzalez was evaluated at [facility] on December 22, 2022 at which time her blood pressure was:    BP Readings from Last 3 Encounters:   12/20/22 (!) 142/86   12/03/21 120/70   12/01/20 122/70     Pulse Readings from Last 3 Encounters:   12/03/21 89   12/01/20 78   03/02/20 78       Reviewed lifestyle modifications for blood pressure control and reduction: including making healthy food choices, managing weight, getting regular exercise, smoking cessation, reducing alcohol consumption, monitoring blood pressure regularly.     Symptoms: None    BP Goal:< 140/90 mmHg    BP Assessment:  BP too high    Potential Reasons for BP too high: Medication nonadherence    BP Follow-Up Plan: Recheck BP in 6 months at pharmacy    Recommendation to Provider: Patient has been out of blood pressure medications for several days. Advised patient to  refills and monitor at home. Patient states that home monitoring when she is taking her medications is around 120/80. Counseled patient about the importance of taking medication daily and as prescribed to help control blood pressure.     Note completed by:   Thank you,   Kisha Jeffery, PharmD  [facility] [employer]

## 2023-01-13 ENCOUNTER — OFFICE VISIT (OUTPATIENT)
Dept: FAMILY MEDICINE | Facility: CLINIC | Age: 73
End: 2023-01-13
Payer: COMMERCIAL

## 2023-01-13 VITALS
DIASTOLIC BLOOD PRESSURE: 88 MMHG | OXYGEN SATURATION: 98 % | HEIGHT: 64 IN | TEMPERATURE: 97.7 F | HEART RATE: 116 BPM | RESPIRATION RATE: 24 BRPM | SYSTOLIC BLOOD PRESSURE: 138 MMHG | WEIGHT: 271.7 LBS | BODY MASS INDEX: 46.38 KG/M2

## 2023-01-13 DIAGNOSIS — E78.5 HYPERLIPIDEMIA LDL GOAL <160: ICD-10-CM

## 2023-01-13 DIAGNOSIS — Z00.00 ENCOUNTER FOR MEDICARE ANNUAL WELLNESS EXAM: ICD-10-CM

## 2023-01-13 DIAGNOSIS — E66.01 MORBID OBESITY, UNSPECIFIED OBESITY TYPE (H): ICD-10-CM

## 2023-01-13 DIAGNOSIS — Z23 NEED FOR VACCINATION: ICD-10-CM

## 2023-01-13 DIAGNOSIS — K51.00 ULCERATIVE PANCOLITIS WITHOUT COMPLICATION (H): ICD-10-CM

## 2023-01-13 DIAGNOSIS — M85.80 OSTEOPENIA, UNSPECIFIED LOCATION: ICD-10-CM

## 2023-01-13 DIAGNOSIS — I10 ESSENTIAL HYPERTENSION: ICD-10-CM

## 2023-01-13 DIAGNOSIS — R73.03 PREDIABETES: Primary | ICD-10-CM

## 2023-01-13 LAB
ANION GAP SERPL CALCULATED.3IONS-SCNC: 17 MMOL/L (ref 7–15)
BUN SERPL-MCNC: 18 MG/DL (ref 8–23)
CALCIUM SERPL-MCNC: 9.7 MG/DL (ref 8.8–10.2)
CHLORIDE SERPL-SCNC: 103 MMOL/L (ref 98–107)
CHOLEST SERPL-MCNC: 252 MG/DL
CREAT SERPL-MCNC: 0.72 MG/DL (ref 0.51–0.95)
DEPRECATED HCO3 PLAS-SCNC: 20 MMOL/L (ref 22–29)
GFR SERPL CREATININE-BSD FRML MDRD: 88 ML/MIN/1.73M2
GLUCOSE SERPL-MCNC: 106 MG/DL (ref 70–99)
HBA1C MFR BLD: 5.9 % (ref 0–5.6)
HDLC SERPL-MCNC: 68 MG/DL
LDLC SERPL CALC-MCNC: 167 MG/DL
NONHDLC SERPL-MCNC: 184 MG/DL
POTASSIUM SERPL-SCNC: 4.1 MMOL/L (ref 3.4–5.3)
SODIUM SERPL-SCNC: 140 MMOL/L (ref 136–145)
TRIGL SERPL-MCNC: 87 MG/DL

## 2023-01-13 PROCEDURE — G0439 PPPS, SUBSEQ VISIT: HCPCS | Performed by: NURSE PRACTITIONER

## 2023-01-13 PROCEDURE — 90662 IIV NO PRSV INCREASED AG IM: CPT | Performed by: NURSE PRACTITIONER

## 2023-01-13 PROCEDURE — 80048 BASIC METABOLIC PNL TOTAL CA: CPT | Performed by: NURSE PRACTITIONER

## 2023-01-13 PROCEDURE — 91312 COVID-19 VACCINE BIVALENT BOOSTER 12+ (PFIZER): CPT | Performed by: NURSE PRACTITIONER

## 2023-01-13 PROCEDURE — 83036 HEMOGLOBIN GLYCOSYLATED A1C: CPT | Performed by: NURSE PRACTITIONER

## 2023-01-13 PROCEDURE — 0124A COVID-19 VACCINE BIVALENT BOOSTER 12+ (PFIZER): CPT | Performed by: NURSE PRACTITIONER

## 2023-01-13 PROCEDURE — G0008 ADMIN INFLUENZA VIRUS VAC: HCPCS | Performed by: NURSE PRACTITIONER

## 2023-01-13 PROCEDURE — 99214 OFFICE O/P EST MOD 30 MIN: CPT | Mod: 25 | Performed by: NURSE PRACTITIONER

## 2023-01-13 PROCEDURE — 36415 COLL VENOUS BLD VENIPUNCTURE: CPT | Performed by: NURSE PRACTITIONER

## 2023-01-13 PROCEDURE — 80061 LIPID PANEL: CPT | Performed by: NURSE PRACTITIONER

## 2023-01-13 RX ORDER — IRBESARTAN 150 MG/1
150 TABLET ORAL DAILY
Qty: 90 TABLET | Refills: 3 | Status: SHIPPED | OUTPATIENT
Start: 2023-01-13 | End: 2024-02-05

## 2023-01-13 RX ORDER — ROSUVASTATIN CALCIUM 5 MG/1
5 TABLET, COATED ORAL DAILY
Qty: 90 TABLET | Refills: 3 | Status: SHIPPED | OUTPATIENT
Start: 2023-01-13 | End: 2024-01-22

## 2023-01-13 RX ORDER — HYDROCHLOROTHIAZIDE 12.5 MG/1
12.5 TABLET ORAL DAILY
Qty: 90 TABLET | Refills: 3 | Status: SHIPPED | OUTPATIENT
Start: 2023-01-13 | End: 2024-02-29

## 2023-01-13 ASSESSMENT — PAIN SCALES - GENERAL: PAINLEVEL: NO PAIN (0)

## 2023-01-13 NOTE — NURSING NOTE
"Chief Complaint   Patient presents with     Medicare Visit     Initial /88 (BP Location: Right arm, Patient Position: Sitting, Cuff Size: Adult Large)   Pulse 116   Temp 97.7  F (36.5  C) (Oral)   Resp 24   Ht 1.632 m (5' 4.25\")   Wt 123.2 kg (271 lb 11.2 oz)   SpO2 98%   BMI 46.27 kg/m   Estimated body mass index is 46.27 kg/m  as calculated from the following:    Height as of this encounter: 1.632 m (5' 4.25\").    Weight as of this encounter: 123.2 kg (271 lb 11.2 oz).  BP completed using cuff size large right arm    Lisa Magill, CMA    "

## 2023-01-13 NOTE — LETTER
January 17, 2023      Jaylyn Gonzalez  [id number] [facility] [id number]        Jaylyn,     Please find a copy of your recent lab results enclosed.     To summarize your results:     The cholesterol levels have increased which causes your risk of stroke and heart attack to increase as well.  It is a good that we decided to start on cholesterol medication at your appointment.     The electrolytes and kidneys look good.  The blood sugar is just above normal, but not worrisome. The long term blood sugar test is stable and even a bit better than last time.     Please make a lab only appointment in April for your cholesterol to be rechecked.  Come fasting to your lab appointment.  You can call our office at [phone] to schedule your appointment.     Jay,     Samantha Kwan CNP       Resulted Orders   Lipid panel reflex to direct LDL Fasting   Result Value Ref Range    Cholesterol 252 (H) <200 mg/dL    Triglycerides 87 <150 mg/dL    Direct Measure HDL 68 >=50 mg/dL    LDL Cholesterol Calculated 167 (H) <=100 mg/dL    Non HDL Cholesterol 184 (H) <130 mg/dL    Narrative    Cholesterol  Desirable:  <200 mg/dL    Triglycerides  Normal:  Less than 150 mg/dL  Borderline High:  150-199 mg/dL  High:  200-499 mg/dL  Very High:  Greater than or equal to 500 mg/dL    Direct Measure HDL  Female:  Greater than or equal to 50 mg/dL   Male:  Greater than or equal to 40 mg/dL    LDL Cholesterol  Desirable:  <100mg/dL  Above Desirable:  100-129 mg/dL   Borderline High:  130-159 mg/dL   High:  160-189 mg/dL   Very High:  >= 190 mg/dL    Non HDL Cholesterol  Desirable:  130 mg/dL  Above Desirable:  130-159 mg/dL  Borderline High:  160-189 mg/dL  High:  190-219 mg/dL  Very High:  Greater than or equal to 220 mg/dL   Basic metabolic panel  (Ca, Cl, CO2, Creat, Gluc, K, Na, BUN)   Result Value Ref Range    Sodium 140 136 - 145 mmol/L    Potassium 4.1 3.4 - 5.3 mmol/L    Chloride 103 98 - 107 mmol/L    Carbon Dioxide (CO2) 20 (L)  22 - 29 mmol/L    Anion Gap 17 (H) 7 - 15 mmol/L    Urea Nitrogen 18.0 8.0 - 23.0 mg/dL    Creatinine 0.72 0.51 - 0.95 mg/dL    Calcium 9.7 8.8 - 10.2 mg/dL    Glucose 106 (H) 70 - 99 mg/dL    GFR Estimate 88 >60 mL/min/1.73m2      Comment:      Effective December 21, 2021 eGFRcr in adults is calculated using the 2021 CKD-EPI creatinine equation which includes age and gender (Juan et al., NEJM, DOI: 10.1056/QVIMzx3318135)   Hemoglobin A1c   Result Value Ref Range    Hemoglobin A1C 5.9 (H) 0.0 - 5.6 %      Comment:      Normal <5.7%   Prediabetes 5.7-6.4%    Diabetes 6.5% or higher     Note: Adopted from ADA consensus guidelines.       If you have any questions or concerns, please call the clinic at the number listed above.

## 2023-01-13 NOTE — PATIENT INSTRUCTIONS
Patient Education   Personalized Prevention Plan  You are due for the preventive services outlined below.  Your care team is available to assist you in scheduling these services.  If you have already completed any of these items, please share that information with your care team to update in your medical record.  Health Maintenance Due   Topic Date Due     COVID-19 Vaccine (4 - Booster for Pfizer series) 01/28/2022     Flu Vaccine (1) 09/01/2022     Osteoporosis Screening  10/28/2022     Cholesterol Lab  11/23/2022     ANNUAL REVIEW OF HM ORDERS  12/03/2022     FALL RISK ASSESSMENT  12/03/2022

## 2023-01-13 NOTE — NURSING NOTE
Covid Pfizer Bivalent and Flu shot given.  See Immunization section for details.  Lisa Magill, CMA

## 2023-01-13 NOTE — PROGRESS NOTES
"  SUBJECTIVE:   Jaylyn Gonzalez is a 72 year old female who presents for Preventive Visit.      Patient has been advised of split billing requirements and indicates understanding: Yes  Are you in the first 12 months of your Medicare Part B coverage?  No    Physical Health:    In general, how would you rate your overall physical health? fair    Outside of work, how many days during the week do you exercise? none    Outside of work, approximately how many minutes a day do you exercise?not applicable    If you drink alcohol do you typically have >3 drinks per day or >7 drinks per week? No    Do you usually eat at least 4 servings of fruit and vegetables a day, include whole grains & fiber and avoid regularly eating high fat or \"junk\" foods? Yes    Do you have any problems taking medications regularly?  YES    Do you have any side effects from medications? none    Needs assistance for the following daily activities: no assistance needed    Which of the following safety concerns are present in your home?  none identified     Hearing impairment: No    In the past 6 months, have you been bothered by leaking of urine? yes    Mental Health:    In general, how would you rate your overall mental or emotional health? good  PHQ-2 Score: 0    Do you feel safe in your environment? YES    Have you ever done Advance Care Planning? (For example, a Health Directive, POLST, or a discussion with a medical provider or your loved ones about your wishes): No, advance care planning information given to patient to review.  Patient plans to discuss their wishes with loved ones or provider.      Additional concerns to address?  No    Fall risk:  Fallen 2 or more times in the past year?: No  Any fall with injury in the past year?: No    Cognitive Screening:Declines     Do you have sleep apnea, excessive snoring or daytime drowsiness?: yes      Spouse lives at the [place]. She lives alone. Winter with all the snow has been frustrating and " stressful.  Aside from frustration with recent weather she mentions she has been doing well.       Cognitive Screenin) Repeat 3 items (Leader, Season, Table)    2) Clock draw: NORMAL  3) 3 item recall: Recalls 3 objects  Results: 3 items recalled: COGNITIVE IMPAIRMENT LESS LIKELY    Mini-CogTM Copyright MARISABEL Milligan. Licensed by the author for use in [facility]; reprinted with permission ([email or web address]). All rights reserved.      Current providers sharing in care for this patient include:   Patient Care Team:  No Ref-Primary, Physician as PCP - Samantha Payan APRN CNP as Assigned PCP    Patient has been advised of split billing requirements and indicates understanding: Yes    Reviewed and updated as needed this visit by clinical staff   Tobacco  Allergies  Meds  Problems  Med Hx  Surg Hx  Fam Hx          Reviewed and updated as needed this visit by Provider                 Social History     Tobacco Use     Smoking status: Never     Smokeless tobacco: Never   Substance Use Topics     Alcohol use: Yes     Comment: occ                           Current providers sharing in care for this patient include:   Patient Care Team:  No Ref-Primary, Physician as PCP - Samantha Paayn APRN CNP as Assigned PCP    The following health maintenance items are reviewed in Epic and correct as of today:  Health Maintenance   Topic Date Due     COVID-19 Vaccine (4 - Booster for Pfizer series) 2022     INFLUENZA VACCINE (1) 2022     DEXA  10/28/2022     LIPID  2022     BMP  2023     MEDICARE ANNUAL WELLNESS VISIT  2024     ANNUAL REVIEW OF HM ORDERS  2024     FALL RISK ASSESSMENT  2024     MAMMO SCREENING  2024     ADVANCE CARE PLANNING  2026     DTAP/TDAP/TD IMMUNIZATION (3 - Td or Tdap) 2027     COLORECTAL CANCER SCREENING  2031     HEPATITIS C SCREENING  Completed     PHQ-2 (once per calendar year)  Completed      Pneumococcal Vaccine: 65+ Years  Completed     ZOSTER IMMUNIZATION  Completed     IPV IMMUNIZATION  Aged Out     MENINGITIS IMMUNIZATION  Aged Out     Labs reviewed in EPIC  BP Readings from Last 3 Encounters:   01/13/23 138/88   12/20/22 (!) 142/86   12/03/21 120/70    Wt Readings from Last 3 Encounters:   01/13/23 123.2 kg (271 lb 11.2 oz)   12/03/21 121.8 kg (268 lb 8 oz)   12/01/20 121.4 kg (267 lb 9.6 oz)                  Current Outpatient Medications   Medication Sig Dispense Refill     Calcium Carbonate (CALCIUM 500 PO) Take  by mouth.       cetirizine (ZYRTEC) 10 MG tablet Take 1 tablet (10 mg) by mouth 2 times daily 28 tablet 0     hydrochlorothiazide (HYDRODIURIL) 12.5 MG tablet Take 1 tablet (12.5 mg) by mouth daily 90 tablet 3     irbesartan (AVAPRO) 150 MG tablet Take 1 tablet (150 mg) by mouth daily 90 tablet 3     mesalamine (ASACOL) 400 MG EC tablet Take 2 tablets (800 mg) by mouth daily as needed , pt usually is taking 1 -  800 mg per day 180 tablet 3     rosuvastatin (CRESTOR) 5 MG tablet Take 1 tablet (5 mg) by mouth daily 90 tablet 3     Allergies   Allergen Reactions     Eggshell Membrane (Chicken) [Egg Shells]      Other reaction(s): Abdominal cramping     Latex Rash     Penicillin G Rash     Sulfa Drugs      Pneumonia Vaccine:complete  Mammogram Screening: Mammogram Screening: Recommended mammography every 1-2 years with patient discussion and risk factor consideration    The 10-year ASCVD risk score (Madeleine PERKINS, et al., 2019) is: 19.7%    Values used to calculate the score:      Age: 72 years      Sex: Female      Is Non- : No      Diabetic: No      Tobacco smoker: No      Systolic Blood Pressure: 138 mmHg      Is BP treated: Yes      HDL Cholesterol: 70 mg/dL      Total Cholesterol: 219 mg/dL      ROS:  Constitutional, HEENT, cardiovascular, pulmonary, gi and gu systems are negative, except as otherwise noted.    OBJECTIVE:   /88 (BP Location: Right arm, Patient  "Position: Sitting, Cuff Size: Adult Large)   Pulse 116   Temp 97.7  F (36.5  C) (Oral)   Resp 24   Ht 1.632 m (5' 4.25\")   Wt 123.2 kg (271 lb 11.2 oz)   SpO2 98%   BMI 46.27 kg/m   Estimated body mass index is 46.27 kg/m  as calculated from the following:    Height as of this encounter: 1.632 m (5' 4.25\").    Weight as of this encounter: 123.2 kg (271 lb 11.2 oz).  EXAM:   GENERAL: healthy, alert and no distress  EYES: Eyes grossly normal to inspection, PERRL and conjunctivae and sclerae normal  HENT: ear canals and TM's normal, nose and mouth without ulcers or lesions  NECK: no adenopathy, no asymmetry, masses, or scars and thyroid normal to palpation  RESP: lungs clear to auscultation - no rales, rhonchi or wheezes  CV: regular rate and rhythm, normal S1 S2, no S3 or S4, no murmur, click or rub, no peripheral edema and peripheral pulses strong  ABDOMEN: soft, nontender, no hepatosplenomegaly, no masses and bowel sounds normal  MS: no gross musculoskeletal defects noted, no edema  SKIN: no suspicious lesions or rashes  NEURO: Normal strength and tone, mentation intact and speech normal  PSYCH: mentation appears normal, affect normal/bright    Diagnostic Test Results:  Labs reviewed in Epic    ASSESSMENT / PLAN:   1. Encounter for Medicare annual wellness exam  Reviewed age appropriate screenings and immunizations.    2. Hyperlipidemia LDL goal <160  Discussed elevated risk score; she prefers to start on medication today.  Will recheck lipids in 3 months.  Discussed diet, exercise and weight loss.   - Lipid panel reflex to direct LDL Fasting; Future  - rosuvastatin (CRESTOR) 5 MG tablet; Take 1 tablet (5 mg) by mouth daily  Dispense: 90 tablet; Refill: 3  - Lipid panel reflex to direct LDL Fasting; Future  - Lipid panel reflex to direct LDL Fasting    3. Prediabetes  Rechecking today; continue to work on diet changes.   - Hemoglobin A1c; Future  - Hemoglobin A1c    4. Essential hypertension  Chronic, " "borderline control today.  Updating labs.  Work on weight loss with diet and exercise.   - Basic metabolic panel  (Ca, Cl, CO2, Creat, Gluc, K, Na, BUN); Future  - hydrochlorothiazide (HYDRODIURIL) 12.5 MG tablet; Take 1 tablet (12.5 mg) by mouth daily  Dispense: 90 tablet; Refill: 3  - irbesartan (AVAPRO) 150 MG tablet; Take 1 tablet (150 mg) by mouth daily  Dispense: 90 tablet; Refill: 3  - Basic metabolic panel  (Ca, Cl, CO2, Creat, Gluc, K, Na, BUN)    5. Need for vaccination  Administered today   - COVID-19,PF,PFIZER BOOSTER BIVALENT (12+YRS)  - INFLUENZA, QUAD, HIGH DOSE, PF, 65YR + (FLUZONE HD)    6. Ulcerative pancolitis without complication (H)  Not having flares; continues on mesalamine--managed by [facility].    7. Morbid obesity, unspecified obesity type (H)  Discussed diet and exercise.  She prefers to go for walks in stores so the ground is level.  Watch portion size.  Work on reducing soda and sweets.     8. Osteopenia   Continue on calcium and vitamin D.  She declines updating Dexa.  Continue with weight bearing exercise.         COUNSELING:  Reviewed preventive health counseling, as reflected in patient instructions       Regular exercise       Healthy diet/nutrition       Dental care       Immunizations    Vaccinated for: Covid-19 and Influenza             Osteoporosis prevention/bone health    Estimated body mass index is 46.27 kg/m  as calculated from the following:    Height as of this encounter: 1.632 m (5' 4.25\").    Weight as of this encounter: 123.2 kg (271 lb 11.2 oz).    Weight management plan: Discussed healthy diet and exercise guidelines    She reports that she has never smoked. She has never used smokeless tobacco.    Appropriate preventive services were discussed with this patient, including applicable screening as appropriate for cardiovascular disease, diabetes, osteopenia/osteoporosis, and glaucoma.  As appropriate for age/gender, discussed screening for colorectal cancer, prostate " cancer, breast cancer, and cervical cancer. Checklist reviewing preventive services available has been given to the patient.    Reviewed patients plan of care and provided an AVS. The Basic Care Plan (routine screening as documented in Health Maintenance) for Jaylyn meets the Care Plan requirement. This Care Plan has been established and reviewed with the Patient.    Counseling Resources:  ATP IV Guidelines  Pooled Cohorts Equation Calculator  Breast Cancer Risk Calculator  BRCA-Related Cancer Risk Assessment: FHS-7 Tool  FRAX Risk Assessment  ICSI Preventive Guidelines  Dietary Guidelines for Americans, 2010  USDA's MyPlate  ASA Prophylaxis  Lung CA Screening    LAURENT Irving CNP  [facility]

## 2023-03-14 ENCOUNTER — TRANSFERRED RECORDS (OUTPATIENT)
Dept: HEALTH INFORMATION MANAGEMENT | Facility: CLINIC | Age: 73
End: 2023-03-14
Payer: COMMERCIAL

## 2023-04-07 ENCOUNTER — LAB (OUTPATIENT)
Dept: LAB | Facility: CLINIC | Age: 73
End: 2023-04-07
Payer: COMMERCIAL

## 2023-04-07 DIAGNOSIS — E78.5 HYPERLIPIDEMIA LDL GOAL <160: ICD-10-CM

## 2023-04-07 LAB
CHOLEST SERPL-MCNC: 184 MG/DL
HDLC SERPL-MCNC: 73 MG/DL
LDLC SERPL CALC-MCNC: 95 MG/DL
NONHDLC SERPL-MCNC: 111 MG/DL
TRIGL SERPL-MCNC: 82 MG/DL

## 2023-04-07 PROCEDURE — 80061 LIPID PANEL: CPT

## 2023-04-07 PROCEDURE — 36415 COLL VENOUS BLD VENIPUNCTURE: CPT

## 2023-10-30 ENCOUNTER — TRANSFERRED RECORDS (OUTPATIENT)
Dept: HEALTH INFORMATION MANAGEMENT | Facility: CLINIC | Age: 73
End: 2023-10-30
Payer: COMMERCIAL

## 2024-01-20 DIAGNOSIS — E78.5 HYPERLIPIDEMIA LDL GOAL <160: ICD-10-CM

## 2024-01-22 RX ORDER — ROSUVASTATIN CALCIUM 5 MG/1
5 TABLET, COATED ORAL DAILY
Qty: 90 TABLET | Refills: 0 | Status: SHIPPED | OUTPATIENT
Start: 2024-01-22 | End: 2024-02-29

## 2024-02-05 DIAGNOSIS — I10 ESSENTIAL HYPERTENSION: Primary | ICD-10-CM

## 2024-02-05 RX ORDER — IRBESARTAN 150 MG/1
150 TABLET ORAL DAILY
Qty: 30 TABLET | Refills: 0 | Status: SHIPPED | OUTPATIENT
Start: 2024-02-05 | End: 2024-02-29

## 2024-02-06 ENCOUNTER — PATIENT OUTREACH (OUTPATIENT)
Dept: GASTROENTEROLOGY | Facility: CLINIC | Age: 74
End: 2024-02-06
Payer: COMMERCIAL

## 2024-02-29 ENCOUNTER — OFFICE VISIT (OUTPATIENT)
Dept: FAMILY MEDICINE | Facility: CLINIC | Age: 74
End: 2024-02-29
Payer: COMMERCIAL

## 2024-02-29 VITALS
TEMPERATURE: 97.8 F | DIASTOLIC BLOOD PRESSURE: 78 MMHG | SYSTOLIC BLOOD PRESSURE: 132 MMHG | RESPIRATION RATE: 14 BRPM | BODY MASS INDEX: 45.75 KG/M2 | WEIGHT: 268 LBS | HEART RATE: 118 BPM | HEIGHT: 64 IN | OXYGEN SATURATION: 96 %

## 2024-02-29 DIAGNOSIS — Z12.31 VISIT FOR SCREENING MAMMOGRAM: ICD-10-CM

## 2024-02-29 DIAGNOSIS — E66.01 MORBID OBESITY, UNSPECIFIED OBESITY TYPE (H): ICD-10-CM

## 2024-02-29 DIAGNOSIS — L68.9 EXCESSIVE HAIR GROWTH: ICD-10-CM

## 2024-02-29 DIAGNOSIS — I10 ESSENTIAL HYPERTENSION: ICD-10-CM

## 2024-02-29 DIAGNOSIS — E78.5 HYPERLIPIDEMIA LDL GOAL <160: ICD-10-CM

## 2024-02-29 DIAGNOSIS — R73.03 PREDIABETES: ICD-10-CM

## 2024-02-29 DIAGNOSIS — Z00.00 ENCOUNTER FOR MEDICARE ANNUAL WELLNESS EXAM: Primary | ICD-10-CM

## 2024-02-29 DIAGNOSIS — Z78.0 POST-MENOPAUSAL: ICD-10-CM

## 2024-02-29 DIAGNOSIS — K51.00 ULCERATIVE PANCOLITIS WITHOUT COMPLICATION (H): ICD-10-CM

## 2024-02-29 PROBLEM — M21.42 FALLEN ARCHES: Status: ACTIVE | Noted: 2024-02-29

## 2024-02-29 PROBLEM — M21.41 FALLEN ARCHES: Status: ACTIVE | Noted: 2024-02-29

## 2024-02-29 LAB
ERYTHROCYTE [DISTWIDTH] IN BLOOD BY AUTOMATED COUNT: 13 % (ref 10–15)
HBA1C MFR BLD: 6.2 % (ref 0–5.6)
HCT VFR BLD AUTO: 43.1 % (ref 35–47)
HGB BLD-MCNC: 14.4 G/DL (ref 11.7–15.7)
MCH RBC QN AUTO: 32.2 PG (ref 26.5–33)
MCHC RBC AUTO-ENTMCNC: 33.4 G/DL (ref 31.5–36.5)
MCV RBC AUTO: 96 FL (ref 78–100)
PLATELET # BLD AUTO: 406 10E3/UL (ref 150–450)
RBC # BLD AUTO: 4.47 10E6/UL (ref 3.8–5.2)
WBC # BLD AUTO: 10.1 10E3/UL (ref 4–11)

## 2024-02-29 PROCEDURE — 80053 COMPREHEN METABOLIC PANEL: CPT | Performed by: FAMILY MEDICINE

## 2024-02-29 PROCEDURE — 84270 ASSAY OF SEX HORMONE GLOBUL: CPT | Performed by: FAMILY MEDICINE

## 2024-02-29 PROCEDURE — 84443 ASSAY THYROID STIM HORMONE: CPT | Performed by: FAMILY MEDICINE

## 2024-02-29 PROCEDURE — 80061 LIPID PANEL: CPT | Performed by: FAMILY MEDICINE

## 2024-02-29 PROCEDURE — 36415 COLL VENOUS BLD VENIPUNCTURE: CPT | Performed by: FAMILY MEDICINE

## 2024-02-29 PROCEDURE — G0439 PPPS, SUBSEQ VISIT: HCPCS | Performed by: FAMILY MEDICINE

## 2024-02-29 PROCEDURE — 83036 HEMOGLOBIN GLYCOSYLATED A1C: CPT | Performed by: FAMILY MEDICINE

## 2024-02-29 PROCEDURE — 84403 ASSAY OF TOTAL TESTOSTERONE: CPT | Performed by: FAMILY MEDICINE

## 2024-02-29 PROCEDURE — 99214 OFFICE O/P EST MOD 30 MIN: CPT | Mod: 25 | Performed by: FAMILY MEDICINE

## 2024-02-29 PROCEDURE — 85027 COMPLETE CBC AUTOMATED: CPT | Performed by: FAMILY MEDICINE

## 2024-02-29 RX ORDER — HYDROCHLOROTHIAZIDE 12.5 MG/1
12.5 TABLET ORAL DAILY
Qty: 90 TABLET | Refills: 3 | Status: SHIPPED | OUTPATIENT
Start: 2024-02-29

## 2024-02-29 RX ORDER — ROSUVASTATIN CALCIUM 5 MG/1
5 TABLET, COATED ORAL DAILY
Qty: 90 TABLET | Refills: 2 | Status: SHIPPED | OUTPATIENT
Start: 2024-02-29

## 2024-02-29 RX ORDER — IRBESARTAN 150 MG/1
150 TABLET ORAL DAILY
Qty: 90 TABLET | Refills: 3 | Status: SHIPPED | OUTPATIENT
Start: 2024-02-29

## 2024-02-29 NOTE — PATIENT INSTRUCTIONS
Preventive Care Advice   This is general advice given by our system to help you stay healthy. However, your care team may have specific advice just for you. Please talk to your care team about your preventive care needs.  Nutrition  Eat 5 or more servings of fruits and vegetables each day.  Try wheat bread, brown rice and whole grain pasta (instead of white bread, rice, and pasta).  Get enough calcium and vitamin D. Check the label on foods and aim for 100% of the RDA (recommended daily allowance).  Lifestyle  Exercise at least 150 minutes each week   (30 minutes a day, 5 days a week).  Do muscle strengthening activities 2 days a week. These help control your weight and prevent disease.  No smoking.  Wear sunscreen to prevent skin cancer.  Have a dental exam and cleaning every 6 months.  Yearly exams  See your health care team every year to talk about:  Any changes in your health.  Any medicines your care team has prescribed.  Preventive care, family planning, and ways to prevent chronic diseases.  Shots (vaccines)   HPV shots (up to age 26), if you've never had them before.  Hepatitis B shots (up to age 59), if you've never had them before.  COVID-19 shot: Get this shot when it's due.  Flu shot: Get a flu shot every year.  Tetanus shot: Get a tetanus shot every 10 years.  Pneumococcal, hepatitis A, and RSV shots: Ask your care team if you need these based on your risk.  Shingles shot (for age 50 and up).  General health tests  Diabetes screening:  Starting at age 35, Get screened for diabetes at least every 3 years.  If you are younger than age 35, ask your care team if you should be screened for diabetes.  Cholesterol test: At age 39, start having a cholesterol test every 5 years, or more often if advised.  Bone density scan (DEXA): At age 50, ask your care team if you should have this scan for osteoporosis (brittle bones).  Hepatitis C: Get tested at least once in your life.  STIs (sexually transmitted  infections)  Before age 24: Ask your care team if you should be screened for STIs.  After age 24: Get screened for STIs if you're at risk. You are at risk for STIs (including HIV) if:  You are sexually active with more than one person.  You don't use condoms every time.  You or a partner was diagnosed with a sexually transmitted infection.  If you are at risk for HIV, ask about PrEP medicine to prevent HIV.  Get tested for HIV at least once in your life, whether you are at risk for HIV or not.  Cancer screening tests  Cervical cancer screening: If you have a cervix, begin getting regular cervical cancer screening tests at age 21. Most people who have regular screenings with normal results can stop after age 65. Talk about this with your provider.  Breast cancer scan (mammogram): If you've ever had breasts, begin having regular mammograms starting at age 40. This is a scan to check for breast cancer.  Colon cancer screening: It is important to start screening for colon cancer at age 45.  Have a colonoscopy test every 10 years (or more often if you're at risk) Or, ask your provider about stool tests like a FIT test every year or Cologuard test every 3 years.  To learn more about your testing options, visit: https://[email or web address]/538080.pdf.  For help making a decision, visit: https://bit.ly/ed73413.  Prostate cancer screening test: If you have a prostate and are age 55 to 69, ask your provider if you would benefit from a yearly prostate cancer screening test.  Lung cancer screening: If you are a current or former smoker age 50 to 80, ask your care team if ongoing lung cancer screenings are right for you.  For informational purposes only. Not to replace the advice of your health care provider. Copyright   2023 [employer]. All rights reserved. Clinically reviewed by the [facility]. [employer] 966125 - REV 01/24.    Preventing Falls: Care Instructions  Injuries and health  problems such as trouble walking or poor eyesight can increase your risk of falling. So can some medicines. But there are things you can do to help prevent falls. You can exercise to get stronger. You can also arrange your home to make it safer.    Talk to your doctor about the medicines you take. Ask if any of them increase the risk of falls and whether they can be changed or stopped.   Try to exercise regularly. It can help improve your strength and balance. This can help lower your risk of falling.     Practice fall safety and prevention.    Wear low-heeled shoes that fit well and give your feet good support. Talk to your doctor if you have foot problems that make this hard.  Carry a cellphone or wear a medical alert device that you can use to call for help.  Use stepladders instead of chairs to reach high objects. Don't climb if you're at risk for falls. Ask for help, if needed.  Wear the correct eyeglasses, if you need them.    Make your home safer.    Remove rugs, cords, clutter, and furniture from walkways.  Keep your house well lit. Use night-lights in hallways and bathrooms.  Install and use sturdy handrails on stairways.  Wear nonskid footwear, even inside. Don't walk barefoot or in socks without shoes.    Be safe outside.    Use handrails, curb cuts, and ramps whenever possible.  Keep your hands free by using a shoulder bag or backpack.  Try to walk in well-lit areas. Watch out for uneven ground, changes in pavement, and debris.  Be careful in the winter. Walk on the grass or gravel when sidewalks are slippery. Use de-icer on steps and walkways. Add non-slip devices to shoes.    Put grab bars and nonskid mats in your shower or tub and near the toilet. Try to use a shower chair or bath bench when bathing.   Get into a tub or shower by putting in your weaker leg first. Get out with your strong side first. Have a phone or medical alert device in the bathroom with you.   Where can you learn more?  Go to  "https://www.Trak.net/patiented  Enter G117 in the search box to learn more about \"Preventing Falls: Care Instructions.\"  Current as of: July 18, 2023               Content Version: 13.8 2006-2023 [employer].   Care instructions adapted under license by your healthcare professional. If you have questions about a medical condition or this instruction, always ask your healthcare professional. [employer] disclaims any warranty or liability for your use of this information.      Bladder Training: Care Instructions  Your Care Instructions     Bladder training is used to treat urge incontinence and stress incontinence. Urge incontinence means that the need to urinate comes on so fast that you can't get to a toilet in time. Stress incontinence means that you leak urine because of pressure on your bladder. For example, it may happen when you laugh, cough, or lift something heavy.  Bladder training can increase how long you can wait before you have to urinate. It can also help your bladder hold more urine. And it can give you better control over the urge to urinate.  It is important to remember that bladder training takes a few weeks to a few months to make a difference. You may not see results right away, but don't give up.  Follow-up care is a key part of your treatment and safety. Be sure to make and go to all appointments, and call your doctor if you are having problems. It's also a good idea to know your test results and keep a list of the medicines you take.  How can you care for yourself at home?  Work with your doctor to come up with a bladder training program that is right for you. You may use one or more of the following methods.  Delayed urination  In the beginning, try to keep from urinating for 5 minutes after you first feel the need to go.  While you wait, take deep, slow breaths to relax. Kegel exercises can also help you delay the need to go to the bathroom.  After some " "practice, when you can easily wait 5 minutes to urinate, try to wait 10 minutes before you urinate.  Slowly increase the waiting period until you are able to control when you have to urinate.  Scheduled urination  Empty your bladder when you first wake up in the morning.  Schedule times throughout the day when you will urinate.  Start by going to the bathroom every hour, even if you don't need to go.  Slowly increase the time between trips to the bathroom.  When you have found a schedule that works well for you, keep doing it.  If you wake up during the night and have to urinate, do it. Apply your schedule to waking hours only.  Kegel exercises  These tighten and strengthen pelvic muscles, which can help you control the flow of urine. (If doing these exercises causes pain, stop doing them and talk with your doctor.) To do Kegel exercises:  Squeeze your muscles as if you were trying not to pass gas. Or squeeze your muscles as if you were stopping the flow of urine. Your belly, legs, and buttocks shouldn't move.  Hold the squeeze for 3 seconds, then relax for 5 to 10 seconds.  Start with 3 seconds, then add 1 second each week until you are able to squeeze for 10 seconds.  Repeat the exercise 10 times a session. Do 3 to 8 sessions a day.  When should you call for help?  Watch closely for changes in your health, and be sure to contact your doctor if:    Your incontinence is getting worse.     You do not get better as expected.   Where can you learn more?  Go to https://www.wavecatch.net/patiented  Enter V684 in the search box to learn more about \"Bladder Training: Care Instructions.\"  Current as of: February 28, 2023               Content Version: 13.8    0987-3837 [employer].   Care instructions adapted under license by your healthcare professional. If you have questions about a medical condition or this instruction, always ask your healthcare professional. [employer] disclaims any warranty " or liability for your use of this information.

## 2024-02-29 NOTE — LETTER
March 8, 2024      Jaylyn Gonzalez  [address] [facility] [id number]          Dear ,    We are writing to inform you of your test results.    Your metabolic panel, which looks at your electrolytes (sodium, potassium, chloride, and calcium), glucose, kidney function (BUN, creatinine, GFR), and liver function (AST, ALT, alkaline phosphatase, total protein, albumin, and total bilirubin) was within normal limits, aside from glucose.  Prediabetes numbers have increased.     It is going to be important for you to monitor your diet and decrease your carbohydrate intake, specifically limiting pastas, breads, rice, cereals, sugared beverages, desserts, etc.  This will be important to make these changes to help prevent progression to Diabetes.  Increasing exercise and weight loss can also help.  If you are interested in getting a glucometer to start checking your blood sugars, please let me know and one can be sent to your pharmacy. Please also let us know if you are interested in meeting with a diabetic educator to discuss prediabetes and prevention of progression to Type 2 diabetes.     Testosterone levels normal.  Cholesterol looks good. Thyroid numbers fine.     Your CBC, which looks at your white blood cells, red blood cells, hemoglobin, and platelets, was entirely within normal limits.     Resulted Orders   Lipid panel reflex to direct LDL Fasting   Result Value Ref Range    Cholesterol 182 <200 mg/dL    Triglycerides 71 <150 mg/dL    Direct Measure HDL 74 >=50 mg/dL    LDL Cholesterol Calculated 94 <=100 mg/dL    Non HDL Cholesterol 108 <130 mg/dL    Patient Fasting > 8hrs? Yes     Narrative    Cholesterol  Desirable:  <200 mg/dL    Triglycerides  Normal:  Less than 150 mg/dL  Borderline High:  150-199 mg/dL  High:  200-499 mg/dL  Very High:  Greater than or equal to 500 mg/dL    Direct Measure HDL  Female:  Greater than or equal to 50 mg/dL   Male:  Greater than or equal to 40 mg/dL    LDL  Cholesterol  Desirable:  <100mg/dL  Above Desirable:  100-129 mg/dL   Borderline High:  130-159 mg/dL   High:  160-189 mg/dL   Very High:  >= 190 mg/dL    Non HDL Cholesterol  Desirable:  130 mg/dL  Above Desirable:  130-159 mg/dL  Borderline High:  160-189 mg/dL  High:  190-219 mg/dL  Very High:  Greater than or equal to 220 mg/dL   Hemoglobin A1c   Result Value Ref Range    Hemoglobin A1C 6.2 (H) 0.0 - 5.6 %      Comment:      Normal <5.7%   Prediabetes 5.7-6.4%    Diabetes 6.5% or higher     Note: Adopted from ADA consensus guidelines.   Comprehensive metabolic panel (BMP + Alb, Alk Phos, ALT, AST, Total. Bili, TP)   Result Value Ref Range    Sodium 138 135 - 145 mmol/L      Comment:      Reference intervals for this test were updated on 09/26/2023 to more accurately reflect our healthy population. There may be differences in the flagging of prior results with similar values performed with this method. Interpretation of those prior results can be made in the context of the updated reference intervals.     Potassium 4.3 3.4 - 5.3 mmol/L    Carbon Dioxide (CO2) 25 22 - 29 mmol/L    Anion Gap 13 7 - 15 mmol/L    Urea Nitrogen 19.1 8.0 - 23.0 mg/dL    Creatinine 0.92 0.51 - 0.95 mg/dL    GFR Estimate 65 >60 mL/min/1.73m2    Calcium 10.0 8.8 - 10.2 mg/dL    Chloride 100 98 - 107 mmol/L    Glucose 103 (H) 70 - 99 mg/dL    Alkaline Phosphatase 69 40 - 150 U/L      Comment:      Reference intervals for this test were updated on 11/14/2023 to more accurately reflect our healthy population. There may be differences in the flagging of prior results with similar values performed with this method. Interpretation of those prior results can be made in the context of the updated reference intervals.    AST 22 0 - 45 U/L      Comment:      Reference intervals for this test were updated on 6/12/2023 to more accurately reflect our healthy population. There may be differences in the flagging of prior results with similar values  performed with this method. Interpretation of those prior results can be made in the context of the updated reference intervals.    ALT 16 0 - 50 U/L      Comment:      Reference intervals for this test were updated on 6/12/2023 to more accurately reflect our healthy population. There may be differences in the flagging of prior results with similar values performed with this method. Interpretation of those prior results can be made in the context of the updated reference intervals.      Protein Total 7.8 6.4 - 8.3 g/dL    Albumin 4.5 3.5 - 5.2 g/dL    Bilirubin Total 1.0 <=1.2 mg/dL   TSH with free T4 reflex   Result Value Ref Range    TSH 3.61 0.30 - 4.20 uIU/mL   CBC with platelets   Result Value Ref Range    WBC Count 10.1 4.0 - 11.0 10e3/uL    RBC Count 4.47 3.80 - 5.20 10e6/uL    Hemoglobin 14.4 11.7 - 15.7 g/dL    Hematocrit 43.1 35.0 - 47.0 %    MCV 96 78 - 100 fL    MCH 32.2 26.5 - 33.0 pg    MCHC 33.4 31.5 - 36.5 g/dL    RDW 13.0 10.0 - 15.0 %    Platelet Count 406 150 - 450 10e3/uL   Sex Hormone Binding Globulin   Result Value Ref Range    Sex Hormone Binding Globulin 100 30 - 135 nmol/L   Testosterone Free and Total   Result Value Ref Range    Free Testosterone Calculated 0.34 ng/dL      Comment:      Adult Female Reference Range:  18-30 Years: 0.08-0.74 ng/dL  31-40 Years: 0.13-0.92 ng/dL  41-51 Years: 0.11-0.58 ng/dL  Postmenopausal: 0.06-0.38 ng/dL    Testosterone Total 42 8 - 60 ng/dL    Narrative    This test was developed and its performance characteristics determined by the [facility],  Special Chemistry Laboratory. It has not been cleared or approved by the FDA. The laboratory is regulated under CLIA as qualified to perform high-complexity testing. This test is used for clinical purposes. It should not be regarded as investigational or for research.     If you have any questions or concerns, please call the clinic at the number listed above.     Sincerely,    April  Lakeisha Williamson MD

## 2024-02-29 NOTE — PROGRESS NOTES
Preventive Care Visit  [facility]  April NE Williamson MD, Family Medicine  Feb 29, 2024    Assessment & Plan     Encounter for Medicare annual wellness exam  Exam completed today, routine health maintenance items updated as able. Vaccinations to be done at pharmacy per medicare coverage. Labs ordered.  Follow up one year or sooner as needed.  - PRIMARY CARE FOLLOW-UP SCHEDULING; Future  - TSH with free T4 reflex; Future  - CBC with platelets; Future  - TSH with free T4 reflex  - CBC with platelets    Excessive hair growth  Will check labs, recommendations pending results.  - Testosterone Free and Total; Future  - Testosterone Free and Total    Ulcerative pancolitis without complication (H)  Follows with [facility].    Hyperlipidemia LDL goal <160  Due for labs, recommendations pending results.  - Lipid panel reflex to direct LDL Fasting; Future  - rosuvastatin (CRESTOR) 5 MG tablet; Take 1 tablet (5 mg) by mouth daily  - Lipid panel reflex to direct LDL Fasting    Prediabetes  Due for labs, recommendations pending results.  - Hemoglobin A1c; Future  - Hemoglobin A1c    Morbid obesity, unspecified obesity type (H)  Diet and exercise changes to manage co morbid conditions    Visit for screening mammogram  - MA Screen Bilateral w/David; Future    Post-menopausal  - DX Hip/Pelvis/Spine; Future    Essential hypertension  Controlled, continue current medications.  - Comprehensive metabolic panel (BMP + Alb, Alk Phos, ALT, AST, Total. Bili, TP); Future  - hydroCHLOROthiazide 12.5 MG tablet; Take 1 tablet (12.5 mg) by mouth daily  - irbesartan (AVAPRO) 150 MG tablet; Take 1 tablet (150 mg) by mouth daily  - Comprehensive metabolic panel (BMP + Alb, Alk Phos, ALT, AST, Total. Bili, TP)      29 minutes spent by me on the date of the encounter doing chart review, history and exam, documentation and further activities per the note      BMI  Estimated body mass index is 46 kg/m  as calculated from the  "following:    Height as of this encounter: 1.626 m (5' 4\").    Weight as of this encounter: 121.6 kg (268 lb).   Weight management plan: Discussed healthy diet and exercise guidelines    Counseling  Appropriate preventive services were discussed with this patient, including applicable screening as appropriate for fall prevention, nutrition, physical activity, Tobacco-use cessation, weight loss and cognition.  Checklist reviewing preventive services available has been given to the patient.  Reviewed patient's diet, addressing concerns and/or questions.   Information on urinary incontinence and treatment options given to patient.       See Patient Instructions    Subjective   Jaylyn is a 73 year old, presenting for the following:  Annual Visit        2/29/2024     2:08 PM   Additional Questions   Roomed by Payton Lehman       Health Care Directive  Patient does not have a Health Care Directive or Living Will: Discussed advance care planning with patient; information given to patient to review.    HPI    Says her bladder problems are becoming worse.  She has to wear a pad now every time she leaves the house.  She is needing to wear it at night also.  She will hold off on any referrals today.    Of note, she has a lot of facial hair, says she has to shave her face daily.        2/29/2024   General Health   How would you rate your overall physical health? (!) FAIR   Feel stress (tense, anxious, or unable to sleep) Patient declined    To some extent         2/29/2024   Nutrition   Diet: Low salt         2/29/2024   Exercise   Days per week of moderate/strenous exercise Patient declined   Average minutes spent exercising at this level Patient declined         2/29/2024   Social Factors   Frequency of gathering with friends or relatives Patient declined    Once a week   Worry food won't last until get money to buy more No    No   Food not last or not have enough money for food? No    No   Do you have housing?  Yes    Yes "   Are you worried about losing your housing? No    No   Lack of transportation? No    No   Unable to get utilities (heat,electricity)? No    No         2/29/2024   Fall Risk   Fallen 2 or more times in the past year? No    No   Trouble with walking or balance? Yes    Yes   Gait Speed Test Interpretation Less than or equal to 5.00 seconds - PASS           2/29/2024   Activities of Daily Living- Home Safety   Needs help with the following daily activites None of the above   Safety concerns in the home None of the above         2/29/2024   Dental   Dentist two times every year? Yes         2/29/2024   Hearing Screening   Hearing concerns? None of the above         2/29/2024   Driving Risk Screening   Patient/family members have concerns about driving No         2/29/2024   General Alertness/Fatigue Screening   Have you been more tired than usual lately? No         2/29/2024   Urinary Incontinence Screening   Bothered by leaking urine in past 6 months Yes- history of cystocele        Today's PHQ-2 Score:       2/29/2024     2:28 PM   PHQ-2 ( 1999 Pf[employer])   Q1: Little interest or pleasure in doing things 0   Q2: Feeling down, depressed or hopeless 0   PHQ-2 Score 0   Q1: Little interest or pleasure in doing things Not at all   Q2: Feeling down, depressed or hopeless Not at all   PHQ-2 Score 0           2/29/2024   Substance Use   Alcohol more than 3/day or more than 7/wk Not Applicable   Do you have a current opioid prescription? No   How severe/bad is pain from 1 to 10? 0/10 (No Pain)   Do you use any other substances recreationally? No     Social History     Tobacco Use    Smoking status: Never    Smokeless tobacco: Never   Vaping Use    Vaping Use: Never used   Substance Use Topics    Alcohol use: Yes     Comment: occ    Drug use: No           11/29/2022   LAST FHS-7 RESULTS   1st degree relative breast or ovarian cancer No   Any relative bilateral breast cancer No   Any male have breast cancer No   Any ONE woman have  BOTH breast AND ovarian cancer No   Any woman with breast cancer before 50yrs No   2 or more relatives with breast AND/OR ovarian cancer No   2 or more relatives with breast AND/OR bowel cancer No        Mammogram Screening - After age 74- determine frequency with patient based on health status, life expectancy and patient goals    ASCVD Risk   The 10-year ASCVD risk score (Madeleine PERKINS, et al., 2019) is: 22%    Values used to calculate the score:      Age: 73 years      Sex: Female      Is Non- : No      Diabetic: No      Tobacco smoker: No      Systolic Blood Pressure: 140 mmHg      Is BP treated: Yes      HDL Cholesterol: 73 mg/dL      Total Cholesterol: 184 mg/dL            Reviewed and updated as needed this visit by Provider   Tobacco   Meds                Past Medical History:   Diagnosis Date    Colitis     Hypertension     Malignant neoplasm (H)     uterus    Status post complete hysterectomy 1995     Past Surgical History:   Procedure Laterality Date    COLONOSCOPY  11/7/2011    Procedure:COMBINED COLONOSCOPY, SINGLE BIOPSY/POLYPECTOMY BY BIOPSY; COLONOSCOPY  cold bx's; Surgeon:NOE DIAZ; Location: GI    ENDOSCOPIC RETROGRADE CHOLANGIOPANCREATOGRAM  7/30/2012    Procedure: ENDOSCOPIC RETROGRADE CHOLANGIOPANCREATOGRAM;  ENDOSCOPIC RETROGRADE CHOLANGIOPANCREATOGRAM ;  Surgeon: Noe Diaz MD;  Location: [facility] OR    GYN SURGERY      HYSTERECTOMY      hysterectomy total  1995    spot in uterus, cancerous, no follow up needed    LAPAROSCOPIC CHOLECYSTECTOMY  8/7/2012    Procedure: LAPAROSCOPIC CHOLECYSTECTOMY;  LAPAROSCOPIC CHOLECYSTECTOMY;  Surgeon: Viri Garcia MD;  Location: [facility] OR    LAPAROTOMY EXPLORATORY      MOUTH SURGERY       Lab work is in process  Labs reviewed in EPIC  BP Readings from Last 3 Encounters:   02/29/24 (!) 140/80   01/13/23 138/88   12/20/22 (!) 142/86    Wt Readings from Last 3 Encounters:   02/29/24 121.6 kg (268 lb)   01/13/23 123.2  kg (271 lb 11.2 oz)   21 121.8 kg (268 lb 8 oz)                  Patient Active Problem List   Diagnosis    UC (ulcerative colitis) (H)    HTN (hypertension)    Hyperlipidemia LDL goal <160    Advanced directives, counseling/discussion    Seborrheic keratosis    Vitamin D deficiency    Hiatal hernia    Paraumbilical hernia    Snoring    Obesity    Fatigue    Cystocele    Morbid obesity, unspecified obesity type (H)    Diverticular disease of large intestine    History of colonic polyps    Prediabetes    Fallen arches     Past Surgical History:   Procedure Laterality Date    COLONOSCOPY  2011    Procedure:COMBINED COLONOSCOPY, SINGLE BIOPSY/POLYPECTOMY BY BIOPSY; COLONOSCOPY  cold bx's; Surgeon:NOE DIAZ; Location:[facility]    ENDOSCOPIC RETROGRADE CHOLANGIOPANCREATOGRAM  2012    Procedure: ENDOSCOPIC RETROGRADE CHOLANGIOPANCREATOGRAM;  ENDOSCOPIC RETROGRADE CHOLANGIOPANCREATOGRAM ;  Surgeon: Noe Diaz MD;  Location: [facility] OR    GYN SURGERY      HYSTERECTOMY      hysterectomy total  1995    spot in uterus, cancerous, no follow up needed    LAPAROSCOPIC CHOLECYSTECTOMY  2012    Procedure: LAPAROSCOPIC CHOLECYSTECTOMY;  LAPAROSCOPIC CHOLECYSTECTOMY;  Surgeon: Viri Garcia MD;  Location: [facility] OR    LAPAROTOMY EXPLORATORY      MOUTH SURGERY         Social History     Tobacco Use    Smoking status: Never    Smokeless tobacco: Never   Substance Use Topics    Alcohol use: Yes     Comment: occ     Family History   Problem Relation Age of Onset    Parkinsonism Mother          in her 80's ,dementia     Neurologic Disorder Mother         parkinson    Family History Negative Father          from sepsis after hip surgery     Hypertension Father     Psychotic Disorder Brother         1-in group home -MR-64 yo old     Heart Failure Maternal Grandmother     Prostate Cancer Maternal Grandfather     Family History Negative Paternal Grandmother          from old age 103    Family History  Negative Paternal Grandfather         gangrene, no diabetes    Breast Cancer No family hx of     Cancer - colorectal No family hx of          Current Outpatient Medications   Medication Sig Dispense Refill    Calcium Carbonate (CALCIUM 500 PO) Take  by mouth.      hydrochlorothiazide (HYDRODIURIL) 12.5 MG tablet Take 1 tablet (12.5 mg) by mouth daily 90 tablet 3    irbesartan (AVAPRO) 150 MG tablet TAKE 1 TABLET (150 MG) BY MOUTH DAILY 30 tablet 0    mesalamine (ASACOL) 400 MG EC tablet Take 2 tablets (800 mg) by mouth daily as needed , pt usually is taking 1 -  800 mg per day 180 tablet 3    rosuvastatin (CRESTOR) 5 MG tablet TAKE 1 TABLET (5 MG) BY MOUTH DAILY 90 tablet 0    VITAMIN D PO        Allergies   Allergen Reactions    Eggshell Membrane (Chicken) [Egg Shells]      Other reaction(s): Abdominal cramping    Latex Rash    Penicillin G Rash    Sulfa Antibiotics      Recent Labs   Lab Test 04/07/23  1110 01/13/23  1401 11/23/21  1113 12/01/20  1122 11/26/19  0000 09/30/19  0800   A1C  --  5.9* 6.1* 6.0*  --   --    LDL 95 167* 135* 163*  --  143*   HDL 73 68 70 69  --  65   TRIG 82 87 72 104  --  111   ALT  --   --  22 20  --  18   CR  --  0.72 0.68 0.70   < > 0.76   GFRESTIMATED  --  88 88 87  --  79   GFRESTBLACK  --   --   --  >90  --  >90   POTASSIUM  --  4.1 4.1 3.8  --  3.8    < > = values in this interval not displayed.      Current providers sharing in care for this patient include:  Patient Care Team:  Clinic - [facility] as PCP - Samantha Payan APRN CNP as Assigned PCP    The following health maintenance items are reviewed in Epic and correct as of today:  Health Maintenance   Topic Date Due    RSV VACCINE (Pregnancy & 60+) (1 - 1-dose 60+ series) Never done    DEXA  10/28/2022    COVID-19 Vaccine (5 - 2023-24 season) 09/01/2023    BMP  01/13/2024    ANNUAL REVIEW OF HM ORDERS  01/13/2024    LIPID  04/07/2024    MAMMO SCREENING  11/29/2024    MEDICARE ANNUAL  "WELLNESS VISIT  02/28/2025    FALL RISK ASSESSMENT  02/28/2025    COLORECTAL CANCER SCREENING  03/14/2025    GLUCOSE  01/13/2026    DTAP/TDAP/TD IMMUNIZATION (4 - Td or Tdap) 07/17/2027    ADVANCE CARE PLANNING  01/13/2028    HEPATITIS C SCREENING  Completed    PHQ-2 (once per calendar year)  Completed    INFLUENZA VACCINE  Completed    Pneumococcal Vaccine: 65+ Years  Completed    ZOSTER IMMUNIZATION  Completed    IPV IMMUNIZATION  Aged Out    HPV IMMUNIZATION  Aged Out    MENINGITIS IMMUNIZATION  Aged Out    RSV MONOCLONAL ANTIBODY  Aged Out          Objective    Exam  BP (!) 140/80 (BP Location: Right arm, Patient Position: Sitting, Cuff Size: Adult Regular)   Pulse 118   Temp 97.8  F (36.6  C) (Oral)   Resp 14   Ht 1.626 m (5' 4\")   Wt 121.6 kg (268 lb)   LMP  (LMP Unknown)   SpO2 96%   Breastfeeding No   BMI 46.00 kg/m     Estimated body mass index is 46 kg/m  as calculated from the following:    Height as of this encounter: 1.626 m (5' 4\").    Weight as of this encounter: 121.6 kg (268 lb).    Physical Exam  GENERAL: alert and no distress  EYES: Eyes grossly normal to inspection, PERRL and conjunctivae and sclerae normal  HENT: ear canals and TM's normal, nose and mouth without ulcers or lesions  NECK: no adenopathy, no asymmetry, masses, or scars  RESP: lungs clear to auscultation - no rales, rhonchi or wheezes  CV: regular rates and rhythm, normal S1 S2, no S3 or S4, and no murmur, click or rub  ABDOMEN: bowel sounds normal  SKIN: no suspicious lesions or rashes and dark coarse stubble on chin  PSYCH: mentation appears normal, affect normal/bright         2/29/2024   Mini Cog   Clock Draw Score 2 Normal   3 Item Recall 3 objects recalled   Mini Cog Total Score 5            Signed Electronically by: Misti Williamson MD    "

## 2024-03-02 LAB
ALBUMIN SERPL BCG-MCNC: 4.5 G/DL (ref 3.5–5.2)
ALP SERPL-CCNC: 69 U/L (ref 40–150)
ALT SERPL W P-5'-P-CCNC: 16 U/L (ref 0–50)
ANION GAP SERPL CALCULATED.3IONS-SCNC: 13 MMOL/L (ref 7–15)
AST SERPL W P-5'-P-CCNC: 22 U/L (ref 0–45)
BILIRUB SERPL-MCNC: 1 MG/DL
BUN SERPL-MCNC: 19.1 MG/DL (ref 8–23)
CALCIUM SERPL-MCNC: 10 MG/DL (ref 8.8–10.2)
CHLORIDE SERPL-SCNC: 100 MMOL/L (ref 98–107)
CHOLEST SERPL-MCNC: 182 MG/DL
CREAT SERPL-MCNC: 0.92 MG/DL (ref 0.51–0.95)
DEPRECATED HCO3 PLAS-SCNC: 25 MMOL/L (ref 22–29)
EGFRCR SERPLBLD CKD-EPI 2021: 65 ML/MIN/1.73M2
FASTING STATUS PATIENT QL REPORTED: YES
GLUCOSE SERPL-MCNC: 103 MG/DL (ref 70–99)
HDLC SERPL-MCNC: 74 MG/DL
LDLC SERPL CALC-MCNC: 94 MG/DL
NONHDLC SERPL-MCNC: 108 MG/DL
POTASSIUM SERPL-SCNC: 4.3 MMOL/L (ref 3.4–5.3)
PROT SERPL-MCNC: 7.8 G/DL (ref 6.4–8.3)
SHBG SERPL-SCNC: 100 NMOL/L (ref 30–135)
SODIUM SERPL-SCNC: 138 MMOL/L (ref 135–145)
TRIGL SERPL-MCNC: 71 MG/DL
TSH SERPL DL<=0.005 MIU/L-ACNC: 3.61 UIU/ML (ref 0.3–4.2)

## 2024-03-05 LAB
TESTOST FREE SERPL-MCNC: 0.34 NG/DL
TESTOST SERPL-MCNC: 42 NG/DL (ref 8–60)

## 2024-03-11 ENCOUNTER — TRANSFERRED RECORDS (OUTPATIENT)
Dept: HEALTH INFORMATION MANAGEMENT | Facility: CLINIC | Age: 74
End: 2024-03-11
Payer: COMMERCIAL

## 2024-03-11 LAB
CREATININE (EXTERNAL): 0.62 MG/DL (ref 0.57–1)
GFR ESTIMATED (EXTERNAL): 93 ML/MIN/1.73

## 2024-04-19 ENCOUNTER — HOSPITAL ENCOUNTER (OUTPATIENT)
Dept: MAMMOGRAPHY | Facility: CLINIC | Age: 74
Discharge: HOME | End: 2024-04-19
Attending: FAMILY MEDICINE | Admitting: FAMILY MEDICINE
Payer: COMMERCIAL

## 2024-04-19 ENCOUNTER — ANCILLARY PROCEDURE (OUTPATIENT)
Dept: BONE DENSITY | Facility: CLINIC | Age: 74
End: 2024-04-19
Attending: FAMILY MEDICINE
Payer: COMMERCIAL

## 2024-04-19 DIAGNOSIS — Z12.31 VISIT FOR SCREENING MAMMOGRAM: ICD-10-CM

## 2024-04-19 DIAGNOSIS — Z78.0 POST-MENOPAUSAL: ICD-10-CM

## 2024-04-19 PROCEDURE — 77080 DXA BONE DENSITY AXIAL: CPT | Mod: TC | Performed by: PHYSICIAN ASSISTANT

## 2024-04-19 PROCEDURE — 77063 BREAST TOMOSYNTHESIS BI: CPT

## 2024-04-24 ENCOUNTER — TELEPHONE (OUTPATIENT)
Dept: FAMILY MEDICINE | Facility: CLINIC | Age: 74
End: 2024-04-24
Payer: COMMERCIAL

## 2024-04-24 NOTE — TELEPHONE ENCOUNTER
Message #1 left for patient to return call to triage nurse     Floridalma Goins, Registered Nurse  [facility]

## 2024-04-24 NOTE — TELEPHONE ENCOUNTER
Dexa results note below     Coming Misti Williamson MD P Cr Triage  Call patient and let her know that her DEXA shows osteopenia.  Her risk of hip fracture is right on the cusp of high, therefore treatment in addition to calcium and vitamin D is recommended.  Please schedule a visit to discuss treatment options.  A virtual visit (video or phone) is okay if you prefer.    Floridalma Goins, Registered Nurse  [facility]

## 2024-05-03 ENCOUNTER — VIRTUAL VISIT (OUTPATIENT)
Dept: FAMILY MEDICINE | Facility: CLINIC | Age: 74
End: 2024-05-03
Payer: COMMERCIAL

## 2024-05-03 DIAGNOSIS — M85.89 OSTEOPENIA OF MULTIPLE SITES: Primary | ICD-10-CM

## 2024-05-03 DIAGNOSIS — Z91.89 FRACTURE RISK ASSESSMENT SCORE (FRAX) INDICATING GREATER THAN 3% RISK FOR HIP FRACTURE: ICD-10-CM

## 2024-05-03 PROCEDURE — 99441 PR PHYSICIAN TELEPHONE EVALUATION 5-10 MIN: CPT | Mod: 93 | Performed by: FAMILY MEDICINE

## 2024-05-03 RX ORDER — ALENDRONATE SODIUM 10 MG/1
10 TABLET ORAL
Qty: 90 TABLET | Refills: 3 | Status: SHIPPED | OUTPATIENT
Start: 2024-05-03

## 2024-05-03 NOTE — PROGRESS NOTES
Jaylyn is a 73 year old who is being evaluated via a billable telephone visit.    What phone number would you like to be contacted at? [phone]  How would you like to obtain your AVS? Mail a copy  Originating Location (pt. Location): Home    Distant Location (provider location):  Off-site    Assessment & Plan     Osteopenia of multiple sites  Fracture Risk Assessment Score (FRAX) indicating greater than 3% risk for hip fracture  Will have patient start daily alendronate for better compliance.  Reviewed side effects, specifically jaw pain,to monitor for.  Follow up if not tolerating and we can consider infusions.  - alendronate (FOSAMAX) 10 MG tablet; Take 1 tablet (10 mg) by mouth every morning (before breakfast)    14 minutes spent by me on the date of the encounter doing chart review, history and exam, documentation and further activities per the note        See Patient Instructions    Subjective   Jaylyn is a 73 year old, presenting for the following health issues:  Results (Discuss dexa results )        5/3/2024    10:36 AM   Additional Questions   Roomed by Bettie COSBY     Patient would like to discuss bone density results, no other concern     She was on medication in the past, thinks it was Fosamax.  Used it for less than a year she thinks, she had trouble using it once per week.  She would forget to take it or she would eat before.            Objective           Vitals:  No vitals were obtained today due to virtual visit.    Physical Exam   General: Alert and no distress //Respiratory: No audible wheeze, cough, or shortness of breath // Psychiatric:  Appropriate affect, tone, and pace of words      DEXA results reviewed      Phone call duration: 10 minutes  Signed Electronically by: Misti Williamson MD

## 2024-09-12 ENCOUNTER — OFFICE VISIT (OUTPATIENT)
Dept: FAMILY MEDICINE | Facility: CLINIC | Age: 74
End: 2024-09-12
Payer: COMMERCIAL

## 2024-09-12 ENCOUNTER — ANCILLARY PROCEDURE (OUTPATIENT)
Dept: GENERAL RADIOLOGY | Facility: CLINIC | Age: 74
End: 2024-09-12
Payer: COMMERCIAL

## 2024-09-12 ENCOUNTER — TELEPHONE (OUTPATIENT)
Dept: FAMILY MEDICINE | Facility: CLINIC | Age: 74
End: 2024-09-12

## 2024-09-12 VITALS
WEIGHT: 261 LBS | HEIGHT: 64 IN | TEMPERATURE: 97.8 F | RESPIRATION RATE: 22 BRPM | OXYGEN SATURATION: 97 % | BODY MASS INDEX: 44.56 KG/M2 | SYSTOLIC BLOOD PRESSURE: 148 MMHG | HEART RATE: 95 BPM | DIASTOLIC BLOOD PRESSURE: 80 MMHG

## 2024-09-12 DIAGNOSIS — M25.571 PAIN IN JOINT, ANKLE AND FOOT, RIGHT: Primary | ICD-10-CM

## 2024-09-12 DIAGNOSIS — M79.661 PAIN IN RIGHT SHIN: ICD-10-CM

## 2024-09-12 PROCEDURE — 73630 X-RAY EXAM OF FOOT: CPT | Mod: TC | Performed by: RADIOLOGY

## 2024-09-12 PROCEDURE — 73590 X-RAY EXAM OF LOWER LEG: CPT | Mod: TC | Performed by: RADIOLOGY

## 2024-09-12 PROCEDURE — 99213 OFFICE O/P EST LOW 20 MIN: CPT

## 2024-09-12 PROCEDURE — G2211 COMPLEX E/M VISIT ADD ON: HCPCS

## 2024-09-12 NOTE — TELEPHONE ENCOUNTER
"Notified Patient of LAURENT Ramos, CNP's message: \"Please notify patient that xrays do not indicate any fracture, however there is notable degeneration/arthritis in her foot ankle and knee. I would suspect this could be contributing, recommend trying the Voltaren gel prescribed today. Follow up if not improving.\"    Person was given an opportunity to ask questions, verbalized understanding of plan, and is agreeable.    Randi Ybarra RN on 9/12/2024 at 4:20 PM    "

## 2024-09-12 NOTE — PROGRESS NOTES
"  Assessment & Plan     (M25.971) Pain in joint, ankle and foot, right  (primary encounter diagnosis)  Comment: suspect could have some OA, cannot rule out any tendinopathy. However, patient does have pertinent hx of low bone density given worsening pain will get xray to rule in/out any concern for stress fracture. Will provide patient w/ ankle brace order. Would like patient to utilize Voltaren gel and ice frequently. Discussed medication risks and benefits of Voltaren with patient in detail with patient verbal understanding. Will follow up on results and whether further direction is necessary. Patient fully understands and is agreeable with plan of care, at this point patient will follow up as needed unless acute concerns arise in the meantime.  Plan: Ankle/Foot Bracing Supplies Order Ankle Brace;         Right, diclofenac (VOLTAREN) 1 % topical gel,         XR Foot Right G/E 3 Views, XR Tibia and Fibula         Right 2 Views    (M79.661) Pain in right shin  Comment: as above.   Plan: diclofenac (VOLTAREN) 1 % topical gel, XR Foot         Right G/E 3 Views, XR Tibia and Fibula Right 2         Views      The longitudinal plan of care for the diagnosis(es)/condition(s) as documented were addressed during this visit. Due to the added complexity in care, I will continue to support Jaylyn in the subsequent management and with ongoing continuity of care.    BMI  Estimated body mass index is 44.8 kg/m  as calculated from the following:    Height as of this encounter: 1.626 m (5' 4\").    Weight as of this encounter: 118.4 kg (261 lb).     Subjective   Jaylyn is a 73 year old, presenting for the following health issues:  Musculoskeletal Problem      9/12/2024     1:35 PM   Additional Questions   Roomed by Alexia     Musculoskeletal Problem    History of Present Illness       Reason for visit:  Fallen instep, arch sore ankle on right side  Symptom onset:  3-4 weeks ago  Symptoms include:  Pain in legs, foot and ankle  Symptom " intensity:  Moderate  Symptom progression:  Worsening  Had these symptoms before:  Yes  Has tried/received treatment for these symptoms:  Yes  Previous treatment was successful:  Yes  Prior treatment description:  Metal insteps  What makes it worse:  Overuse and activity, walking  What makes it better:  Dr jose francisco avila   She is taking medications regularly.     Pain History:  When did you first notice your pain? About a month   Have you seen anyone else for your pain? No  How has your pain affected your ability to work? Not currently working - unrelated to pain  Where in your body do you have pain? Musculoskeletal problem/pain  Onset/Duration: about a month  Description  Location: foot and leg - right but does have shin pain in left leg as well  Joint Swelling: No  Redness: No  Pain: YES  Warmth: No  Intensity:  2-3/10 currently due to babying, 9-10/10 at its worse  Progression of Symptoms:  worsening  Accompanying signs and symptoms:   Fevers: No  Numbness/tingling/weakness: YES- she has but not currently  History  Trauma to the area: No  Recent illness:  No  Previous similar problem: YES  Previous evaluation:  No  Precipitating or alleviating factors:  Aggravating factors include: standing, walking, exercise, and overuse  Therapies tried and outcome: stretching    Review of Systems  Constitutional, musculoskeletal systems are negative, except as otherwise noted.      Objective    LMP  (LMP Unknown)   There is no height or weight on file to calculate BMI.  Physical Exam  Vitals and nursing note reviewed.   Constitutional:       General: She is not in acute distress.     Appearance: Normal appearance. She is obese. She is not ill-appearing.   Cardiovascular:      Rate and Rhythm: Normal rate.      Pulses: Normal pulses.   Pulmonary:      Effort: Pulmonary effort is normal.   Musculoskeletal:      Right lower leg: Tenderness present.      Left lower leg: Normal.      Right ankle: Tenderness present over the medial  malleolus.      Right Achilles Tendon: No tenderness.      Left ankle: Normal.      Right foot: Tenderness present.      Left foot: Normal.   Skin:     General: Skin is warm and dry.   Neurological:      Mental Status: She is alert.   Psychiatric:         Mood and Affect: Mood normal.         Behavior: Behavior normal.         Thought Content: Thought content normal.         Judgment: Judgment normal.            Signed Electronically by: LAURENT Ramos CNP

## 2024-09-12 NOTE — TELEPHONE ENCOUNTER
----- Message from Darryl Velez sent at 9/12/2024  4:05 PM CDT -----  Please notify patient that xrays do not indicate any fracture, however there is notable degeneration/arthritis in her foot ankle and knee. I would suspect this could be contributing, recommend trying the Voltaren gel prescribed today. Follow up if not improving.    LAURENT Ramos CNP

## 2024-09-12 NOTE — PATIENT INSTRUCTIONS
Tylenol (1000 mg) three times a day  -please take food with these    Topical Analgesics (Icy Hot, Biofreeze, Voltaren Gel)  -can use up to 3-4 times aday    Ice (inflammation)/ Heat (Muscle relaxing)    Xray today to evaluate     Right ankle brace to help w/ stability.

## 2024-12-16 ENCOUNTER — PATIENT OUTREACH (OUTPATIENT)
Dept: GASTROENTEROLOGY | Facility: CLINIC | Age: 74
End: 2024-12-16
Payer: COMMERCIAL

## 2024-12-16 DIAGNOSIS — Z12.11 SPECIAL SCREENING FOR MALIGNANT NEOPLASMS, COLON: Primary | ICD-10-CM

## 2024-12-16 NOTE — LETTER
December 16, 2024      Jaylyn Gonzalez  [id number]  [facility] [address]              Roni De La O,    We hope this message  finds you doing well.     Your health is important to us at [facility]. Our records indicate that you could be due, or possibly overdue, for a screening or surveillance colonoscopy if you have not had a colonoscopy since 3/14/2023.     An order has been placed for you to have this completed. Our scheduling team will be reaching out to you to assist in getting this scheduled. If you do not hear from them within 7 days or you would like to schedule sooner, please call [phone], option 2 for endoscopy scheduling.     If you believe this is in error and have already had a colonoscopy done, please have your results and recommendations faxed to [phone].    If you have questions about this order or have further concerns, please reach out to your primary care provider.     Jay     Colorectal Cancer RN Screening Team  [facility]

## 2024-12-16 NOTE — PROGRESS NOTES
"Patient has a history of polyps and IBD. Surveillance colonoscopy is due March 2025. Patient meets criteria for CRC high risk standing order protocol.    CRC Screening Colonoscopy Referral Review    Patient meets the inclusion criteria for screening colonoscopy standing order.    Ordering/Referring Provider:  Misti Williamson MD    BMI: Estimated body mass index is 44.8 kg/m  as calculated from the following:    Height as of 9/12/24: 1.626 m (5' 4\").    Weight as of 9/12/24: 118.4 kg (261 lb).     Sedation:  Does patient have any of the following conditions affecting sedation?  No medical conditions affecting sedation.    Previous Scopes:  Any previous recommendations or follow up needs based on previous scope?  na / No recommendations.    Medical Concerns to Postpone Order:  Does patient have any of the following medical concerns that should postpone/delay colonoscopy referral?  No medical conditions affecting colonoscopy referral.    Final Referral Details:  Based on patient's medical history patient is appropriate for referral order with moderate sedation. If patient's BMI > 50 do not schedule in ASC.  "

## 2025-03-10 ENCOUNTER — TRANSFERRED RECORDS (OUTPATIENT)
Dept: HEALTH INFORMATION MANAGEMENT | Facility: CLINIC | Age: 75
End: 2025-03-10

## 2025-03-10 LAB
CREATININE (EXTERNAL): 0.72 MG/DL (ref 0.57–1)
GFR ESTIMATED (EXTERNAL): 87 ML/MIN/1.73

## 2025-04-07 ENCOUNTER — TELEPHONE (OUTPATIENT)
Dept: FAMILY MEDICINE | Facility: CLINIC | Age: 75
End: 2025-04-07
Payer: COMMERCIAL

## 2025-04-07 NOTE — TELEPHONE ENCOUNTER
Patient Quality Outreach    Patient is due for the following:   Physical Annual Wellness Visit    Action(s) Taken:   Patient has upcoming appointment, these items will be addressed at that time.    Type of outreach:    Chart review performed, no outreach needed.    Questions for provider review:    None         Kisha Castellanos, [facility]  Chart routed to None.

## 2025-04-08 ENCOUNTER — OFFICE VISIT (OUTPATIENT)
Dept: FAMILY MEDICINE | Facility: CLINIC | Age: 75
End: 2025-04-08
Payer: COMMERCIAL

## 2025-04-08 DIAGNOSIS — B37.2 YEAST INFECTION OF THE SKIN: ICD-10-CM

## 2025-04-08 DIAGNOSIS — L60.2 LONG TOENAIL: ICD-10-CM

## 2025-04-08 DIAGNOSIS — E78.5 HYPERLIPIDEMIA LDL GOAL <100: ICD-10-CM

## 2025-04-08 DIAGNOSIS — E66.01 CLASS 3 SEVERE OBESITY DUE TO EXCESS CALORIES WITH SERIOUS COMORBIDITY AND BODY MASS INDEX (BMI) OF 40.0 TO 44.9 IN ADULT (H): ICD-10-CM

## 2025-04-08 DIAGNOSIS — I10 ESSENTIAL HYPERTENSION: ICD-10-CM

## 2025-04-08 DIAGNOSIS — R73.03 PREDIABETES: ICD-10-CM

## 2025-04-08 DIAGNOSIS — Z78.9 POLST (PHYSICIAN ORDERS FOR LIFE-SUSTAINING TREATMENT): ICD-10-CM

## 2025-04-08 DIAGNOSIS — E66.813 CLASS 3 SEVERE OBESITY DUE TO EXCESS CALORIES WITH SERIOUS COMORBIDITY AND BODY MASS INDEX (BMI) OF 40.0 TO 44.9 IN ADULT (H): ICD-10-CM

## 2025-04-08 DIAGNOSIS — Z00.00 ENCOUNTER FOR MEDICARE ANNUAL WELLNESS EXAM: Primary | ICD-10-CM

## 2025-04-08 DIAGNOSIS — M85.89 OSTEOPENIA OF MULTIPLE SITES: ICD-10-CM

## 2025-04-08 DIAGNOSIS — Z51.81 ENCOUNTER FOR MONITORING BISPHOSPHONATE THERAPY: ICD-10-CM

## 2025-04-08 DIAGNOSIS — Z79.83 ENCOUNTER FOR MONITORING BISPHOSPHONATE THERAPY: ICD-10-CM

## 2025-04-08 DIAGNOSIS — Z91.89 FRACTURE RISK ASSESSMENT SCORE (FRAX) INDICATING GREATER THAN 3% RISK FOR HIP FRACTURE: ICD-10-CM

## 2025-04-08 DIAGNOSIS — Z23 NEED FOR COVID-19 VACCINE: ICD-10-CM

## 2025-04-08 DIAGNOSIS — K51.90 ULCERATIVE COLITIS WITHOUT COMPLICATIONS, UNSPECIFIED LOCATION (H): ICD-10-CM

## 2025-04-08 LAB
ALBUMIN SERPL BCG-MCNC: 4.3 G/DL (ref 3.5–5.2)
ALP SERPL-CCNC: 65 U/L (ref 40–150)
ALT SERPL W P-5'-P-CCNC: 14 U/L (ref 0–50)
ANION GAP SERPL CALCULATED.3IONS-SCNC: 12 MMOL/L (ref 7–15)
AST SERPL W P-5'-P-CCNC: 21 U/L (ref 0–45)
BILIRUB SERPL-MCNC: 0.9 MG/DL
BUN SERPL-MCNC: 18.8 MG/DL (ref 8–23)
CALCIUM SERPL-MCNC: 9.8 MG/DL (ref 8.8–10.4)
CHLORIDE SERPL-SCNC: 102 MMOL/L (ref 98–107)
CHOLEST SERPL-MCNC: 186 MG/DL
CREAT SERPL-MCNC: 0.87 MG/DL (ref 0.51–0.95)
EGFRCR SERPLBLD CKD-EPI 2021: 69 ML/MIN/1.73M2
EST. AVERAGE GLUCOSE BLD GHB EST-MCNC: 126 MG/DL
FASTING STATUS PATIENT QL REPORTED: ABNORMAL
FASTING STATUS PATIENT QL REPORTED: NORMAL
GLUCOSE SERPL-MCNC: 107 MG/DL (ref 70–99)
HBA1C MFR BLD: 6 % (ref 0–5.6)
HCO3 SERPL-SCNC: 24 MMOL/L (ref 22–29)
HDLC SERPL-MCNC: 77 MG/DL
HGB BLD-MCNC: 14 G/DL (ref 11.7–15.7)
LDLC SERPL CALC-MCNC: 95 MG/DL
NONHDLC SERPL-MCNC: 109 MG/DL
POTASSIUM SERPL-SCNC: 4.2 MMOL/L (ref 3.4–5.3)
PROT SERPL-MCNC: 7.7 G/DL (ref 6.4–8.3)
SODIUM SERPL-SCNC: 138 MMOL/L (ref 135–145)
TRIGL SERPL-MCNC: 70 MG/DL

## 2025-04-08 PROCEDURE — 85018 HEMOGLOBIN: CPT | Performed by: FAMILY MEDICINE

## 2025-04-08 PROCEDURE — 91320 SARSCV2 VAC 30MCG TRS-SUC IM: CPT | Performed by: FAMILY MEDICINE

## 2025-04-08 PROCEDURE — 90480 ADMN SARSCOV2 VAC 1/ONLY CMP: CPT | Performed by: FAMILY MEDICINE

## 2025-04-08 PROCEDURE — G0439 PPPS, SUBSEQ VISIT: HCPCS | Performed by: FAMILY MEDICINE

## 2025-04-08 PROCEDURE — 99214 OFFICE O/P EST MOD 30 MIN: CPT | Mod: 25 | Performed by: FAMILY MEDICINE

## 2025-04-08 PROCEDURE — 83036 HEMOGLOBIN GLYCOSYLATED A1C: CPT | Performed by: FAMILY MEDICINE

## 2025-04-08 PROCEDURE — 80053 COMPREHEN METABOLIC PANEL: CPT | Performed by: FAMILY MEDICINE

## 2025-04-08 PROCEDURE — 80061 LIPID PANEL: CPT | Performed by: FAMILY MEDICINE

## 2025-04-08 PROCEDURE — G2211 COMPLEX E/M VISIT ADD ON: HCPCS | Performed by: FAMILY MEDICINE

## 2025-04-08 PROCEDURE — 36415 COLL VENOUS BLD VENIPUNCTURE: CPT | Performed by: FAMILY MEDICINE

## 2025-04-08 RX ORDER — NYSTATIN 100000 U/G
CREAM TOPICAL 2 TIMES DAILY
Qty: 90 G | Refills: 2 | Status: SHIPPED | OUTPATIENT
Start: 2025-04-08

## 2025-04-08 RX ORDER — ALENDRONATE SODIUM 10 MG/1
10 TABLET ORAL
Qty: 90 TABLET | Refills: 3 | Status: SHIPPED | OUTPATIENT
Start: 2025-04-08

## 2025-04-08 RX ORDER — MESALAMINE 800 MG/1
800 TABLET, DELAYED RELEASE ORAL 3 TIMES DAILY
COMMUNITY
Start: 2025-03-05

## 2025-04-08 RX ORDER — MESALAMINE 800 MG/1
TABLET, DELAYED RELEASE ORAL
Status: CANCELLED | OUTPATIENT
Start: 2025-04-08

## 2025-04-08 RX ORDER — ROSUVASTATIN CALCIUM 5 MG/1
5 TABLET, COATED ORAL DAILY
Qty: 90 TABLET | Refills: 3 | Status: SHIPPED | OUTPATIENT
Start: 2025-04-08

## 2025-04-08 RX ORDER — IRBESARTAN 150 MG/1
150 TABLET ORAL DAILY
Qty: 90 TABLET | Refills: 3 | Status: SHIPPED | OUTPATIENT
Start: 2025-04-08

## 2025-04-08 RX ORDER — HYDROCHLOROTHIAZIDE 12.5 MG/1
12.5 TABLET ORAL DAILY
Qty: 90 TABLET | Refills: 3 | Status: SHIPPED | OUTPATIENT
Start: 2025-04-08

## 2025-04-08 SDOH — HEALTH STABILITY: PHYSICAL HEALTH: ON AVERAGE, HOW MANY DAYS PER WEEK DO YOU ENGAGE IN MODERATE TO STRENUOUS EXERCISE (LIKE A BRISK WALK)?: 0 DAYS

## 2025-04-08 SDOH — HEALTH STABILITY: PHYSICAL HEALTH: ON AVERAGE, HOW MANY MINUTES DO YOU ENGAGE IN EXERCISE AT THIS LEVEL?: 0 MIN

## 2025-04-08 ASSESSMENT — SOCIAL DETERMINANTS OF HEALTH (SDOH): HOW OFTEN DO YOU GET TOGETHER WITH FRIENDS OR RELATIVES?: ONCE A WEEK

## 2025-04-08 NOTE — PROGRESS NOTES
Preventive Care Visit  [facility]  April NE Williamson MD, Family Medicine  Apr 8, 2025      Assessment & Plan     Encounter for Medicare annual wellness exam  Exam completed today, routine health maintenance items updated as able.  Labs ordered.  Follow up one year or sooner as needed.  Recommend RSV vaccine at pharmacy    Essential hypertension  Slightly elevated today, patient does not monitor at home and does not feel that she would be able to monitor at home.  Continue current medications, follow-up in 6 months or sooner as needed.  - hydroCHLOROthiazide 12.5 MG tablet; Take 1 tablet (12.5 mg) by mouth daily.  - irbesartan (AVAPRO) 150 MG tablet; Take 1 tablet (150 mg) by mouth daily.  - Comprehensive metabolic panel (BMP + Alb, Alk Phos, ALT, AST, Total. Bili, TP); Future  - Hemoglobin; Future  - Comprehensive metabolic panel (BMP + Alb, Alk Phos, ALT, AST, Total. Bili, TP)  - Hemoglobin    Hyperlipidemia LDL goal <100  Due for labs, continue rosuvastatin.  - Lipid panel reflex to direct LDL Non-fasting; Future  - rosuvastatin (CRESTOR) 5 MG tablet; Take 1 tablet (5 mg) by mouth daily.  - Comprehensive metabolic panel (BMP + Alb, Alk Phos, ALT, AST, Total. Bili, TP); Future  - Lipid panel reflex to direct LDL Non-fasting  - Comprehensive metabolic panel (BMP + Alb, Alk Phos, ALT, AST, Total. Bili, TP)    Osteopenia of multiple sites  Fracture Risk Assessment Score (FRAX) indicating greater than 3% risk for hip fracture  Encounter for monitoring bisphosphonate therapy  Patient has been on oral alendronate for approximately 1 year, will consider updating bone density scan 1 year from now.  - alendronate (FOSAMAX) 10 MG tablet; Take 1 tablet (10 mg) by mouth every morning (before breakfast).    Prediabetes  A1c today returned at 6.0, continue with dietary management.  - Hemoglobin A1c; Future  - Hemoglobin A1c    Long toenail  Patient is looking for footcare, no specific concerns today  "other than assistance with nails.  Referral to podiatry for assistance.  - Orthopedic  Referral; Future    POLST (Physician Orders for Life-Sustaining Treatment)  Discussed desired care with patient should she not be able to speak for herself.  She would like to be DO NOT RESUSCITATE/DO NOT INTUBATE.  She desires comfort measures only.  She declines artificial nutrition/feeding tubes/IV fluids.  She declines IV and oral antibiotics.  Forms filled out with patient, copy to be scanned in chart.    Ulcerative colitis without complications, unspecified location (H)  Patient follows with [facility].  On mesalamine.    Yeast infection of the skin  Topical nystatin as needed for infection.  - nystatin (MYCOSTATIN) 221921 UNIT/GM external cream; Apply topically 2 times daily. For up to 14 days, then stop.    Class 3 severe obesity due to excess calories with serious comorbidity and body mass index (BMI) of 40.0 to 44.9 in adult (H)  Dietary management recommended to manage weight and comorbid medical conditions.  Physical activity is limited due to body habitus and chronic medical conditions.  Monitor.    Need for COVID-19 vaccine  - COVID-19 12+ (PFIZER)    The longitudinal plan of care for the diagnosis(es)/condition(s) as documented were addressed during this visit. Due to the added complexity in care, I will continue to support Jaylyn in the subsequent management and with ongoing continuity of care.      BMI  Estimated body mass index is 44.8 kg/m  as calculated from the following:    Height as of 9/12/24: 1.626 m (5' 4\").    Weight as of 9/12/24: 118.4 kg (261 lb).   Weight management plan: Discussed healthy diet and exercise guidelines    Counseling  Appropriate preventive services were addressed with this patient via screening, questionnaire, or discussion as appropriate for fall prevention, nutrition, physical activity, Tobacco-use cessation, social engagement, weight loss and cognition.  Checklist reviewing " preventive services available has been given to the patient.  Reviewed patient's diet, addressing concerns and/or questions.   She is at risk for psychosocial distress and has been provided with information to reduce risk.   Information on urinary incontinence and treatment options given to patient.       See Patient Instructions    Subjective   Jaylyn is a 74 year old, presenting for the following:  Physical        4/8/2025    12:45 PM   Additional Questions   Roomed by Kisha BARTON MA     HPI    Would like to talk about Alendronate and another bone scan  Also needs a refill of mesalamine, her kidney function was checked at [facility] and was normal. Creatinine 0.75 mg/dh and eGFR 87 mL/min/1.73 -    Last bone scan was 4-19-24.    She does not get much warning about when she has to go to the bathroom, so she will not take her diuretic if she has to leave the house.  She did not take it today.  She is not certain she would be able to check her BP at home with her own cuff if she had one, she does not.    Right axilla- she uses and antibacterial soap.  She has been scratching, it is itchy.  It clears up with antibacterial wash.    Advance Care Planning  Patient does not have a Health Care Directive: Discussed advance care planning with patient; however, patient declined at this time.      4/8/2025   General Health   How would you rate your overall physical health? (!) FAIR   Feel stress (tense, anxious, or unable to sleep) Very much   (!) STRESS CONCERN      4/8/2025   Nutrition   Diet: Regular (no restrictions)         4/8/2025   Exercise   Days per week of moderate/strenous exercise 0 days   Average minutes spent exercising at this level 0 min   (!) EXERCISE CONCERN      4/8/2025   Social Factors   Frequency of gathering with friends or relatives Once a week   Worry food won't last until get money to buy more No   Food not last or not have enough money for food? No   Do you have housing? (Housing is defined as stable  permanent housing and does not include staying ouside in a car, in a tent, in an abandoned building, in an overnight shelter, or couch-surfing.) Yes   Are you worried about losing your housing? No   Lack of transportation? No   Unable to get utilities (heat,electricity)? No         4/8/2025   Fall Risk   Fallen 2 or more times in the past year? No    Trouble with walking or balance? Yes    Gait Speed Test (Document in seconds) 4.5   Gait Speed Test Interpretation Less than or equal to 5.00 seconds - PASS       Proxy-reported         4/8/2025   Activities of Daily Living- Home Safety   Needs help with the following daily activites None of the above   Safety concerns in the home None of the above         4/8/2025   Dental   Dentist two times every year? Yes         4/8/2025   Hearing Screening   Hearing concerns? None of the above         4/8/2025   Driving Risk Screening   Patient/family members have concerns about driving No         4/8/2025   General Alertness/Fatigue Screening   Have you been more tired than usual lately? No         4/8/2025   Urinary Incontinence Screening   Bothered by leaking urine in past 6 months Yes     Today's PHQ-2 Score:       4/8/2025    12:58 PM   PHQ-2 ( 1999 Pfizer)   Q1: Little interest or pleasure in doing things 0    Q2: Feeling down, depressed or hopeless 1    PHQ-2 Score 1    Q1: Little interest or pleasure in doing things Not at all   Q2: Feeling down, depressed or hopeless Several days   PHQ-2 Score 1       Proxy-reported         4/8/2025   Substance Use   Alcohol more than 3/day or more than 7/wk Not Applicable   Do you have a current opioid prescription? No   How severe/bad is pain from 1 to 10? 0/10 (No Pain)   Do you use any other substances recreationally? No     Social History     Tobacco Use    Smoking status: Never    Smokeless tobacco: Never   Vaping Use    Vaping status: Never Used   Substance Use Topics    Alcohol use: Yes     Comment: occ    Drug use: No          4/19/2024   LAST FHS-7 RESULTS   1st degree relative breast or ovarian cancer No   Any relative bilateral breast cancer No   Any male have breast cancer No   Any ONE woman have BOTH breast AND ovarian cancer No   Any woman with breast cancer before 50yrs No   2 or more relatives with breast AND/OR ovarian cancer No   2 or more relatives with breast AND/OR bowel cancer No        Mammogram Screening - Mammogram every 1-2 years updated in Health Maintenance based on mutual decision making    ASCVD Risk   The 10-year ASCVD risk score (Madeleine PERKINS, et al., 2019) is: 27.1%    Values used to calculate the score:      Age: 74 years      Sex: Female      Is Non- : No      Diabetic: No      Tobacco smoker: No      Systolic Blood Pressure: 148 mmHg      Is BP treated: Yes      HDL Cholesterol: 74 mg/dL      Total Cholesterol: 182 mg/dL            Reviewed and updated as needed this visit by Provider     Meds              Past Medical History:   Diagnosis Date    Colitis     Hypertension     Malignant neoplasm (H)     uterus    Status post complete hysterectomy 1995     Past Surgical History:   Procedure Laterality Date    COLONOSCOPY  11/7/2011    Procedure:COMBINED COLONOSCOPY, SINGLE BIOPSY/POLYPECTOMY BY BIOPSY; COLONOSCOPY  cold bx's; Surgeon:NOE DIAZ; Location:[facility]    ENDOSCOPIC RETROGRADE CHOLANGIOPANCREATOGRAM  7/30/2012    Procedure: ENDOSCOPIC RETROGRADE CHOLANGIOPANCREATOGRAM;  ENDOSCOPIC RETROGRADE CHOLANGIOPANCREATOGRAM ;  Surgeon: Noe Diaz MD;  Location: [facility]    GYN SURGERY      HYSTERECTOMY      hysterectomy total  1995    spot in uterus, cancerous, no follow up needed    LAPAROSCOPIC CHOLECYSTECTOMY  8/7/2012    Procedure: LAPAROSCOPIC CHOLECYSTECTOMY;  LAPAROSCOPIC CHOLECYSTECTOMY;  Surgeon: Viri Garcia MD;  Location: [facility]    LAPAROTOMY EXPLORATORY      MOUTH SURGERY       Lab work is in process  Labs reviewed in EPIC  BP Readings from Last 3  Encounters:   24 (!) 148/80   24 132/78   23 138/88    Wt Readings from Last 3 Encounters:   24 118.4 kg (261 lb)   24 121.6 kg (268 lb)   23 123.2 kg (271 lb 11.2 oz)                  Patient Active Problem List   Diagnosis    UC (ulcerative colitis) (H)    Essential hypertension    Hyperlipidemia LDL goal <160    Seborrheic keratosis    Vitamin D deficiency    Hiatal hernia    Paraumbilical hernia    Snoring    Obesity    Fatigue    Cystocele    Morbid obesity, unspecified obesity type (H)    Diverticular disease of large intestine    History of colonic polyps    Prediabetes    Fallen arches    Encounter for monitoring bisphosphonate therapy    Fracture Risk Assessment Score (FRAX) indicating greater than 3% risk for hip fracture    Osteopenia of multiple sites    POLST (Physician Orders for Life-Sustaining Treatment)     Past Surgical History:   Procedure Laterality Date    COLONOSCOPY  2011    Procedure:COMBINED COLONOSCOPY, SINGLE BIOPSY/POLYPECTOMY BY BIOPSY; COLONOSCOPY  cold bx's; Surgeon:NOE DIAZ; Location:[facility]    ENDOSCOPIC RETROGRADE CHOLANGIOPANCREATOGRAM  2012    Procedure: ENDOSCOPIC RETROGRADE CHOLANGIOPANCREATOGRAM;  ENDOSCOPIC RETROGRADE CHOLANGIOPANCREATOGRAM ;  Surgeon: Noe Diaz MD;  Location: [facility]    GYN SURGERY      HYSTERECTOMY      hysterectomy total  1995    spot in uterus, cancerous, no follow up needed    LAPAROSCOPIC CHOLECYSTECTOMY  2012    Procedure: LAPAROSCOPIC CHOLECYSTECTOMY;  LAPAROSCOPIC CHOLECYSTECTOMY;  Surgeon: Viri Garcia MD;  Location: [facility]    LAPAROTOMY EXPLORATORY      MOUTH SURGERY         Social History     Tobacco Use    Smoking status: Never    Smokeless tobacco: Never   Substance Use Topics    Alcohol use: Yes     Comment: occ     Family History   Problem Relation Age of Onset    Parkinsonism Mother          in her 80's ,dementia     Neurologic Disorder Mother         parkinson    Family  History Negative Father          from sepsis after hip surgery     Hypertension Father     Psychotic Disorder Brother         1-in group home -MR-64 yo old     Heart Failure Maternal Grandmother     Prostate Cancer Maternal Grandfather     Family History Negative Paternal Grandmother          from old age 103    Family History Negative Paternal Grandfather         gangrene, no diabetes    Breast Cancer No family hx of     Cancer - colorectal No family hx of          Current Outpatient Medications   Medication Sig Dispense Refill    alendronate (FOSAMAX) 10 MG tablet Take 1 tablet (10 mg) by mouth every morning (before breakfast). 90 tablet 3    Calcium Carbonate (CALCIUM 500 PO) Take  by mouth.      hydroCHLOROthiazide 12.5 MG tablet Take 1 tablet (12.5 mg) by mouth daily. 90 tablet 3    irbesartan (AVAPRO) 150 MG tablet Take 1 tablet (150 mg) by mouth daily. 90 tablet 3    mesalamine (ASACOL HD) 800 MG EC tablet Take 800 mg by mouth 3 times daily.      nystatin (MYCOSTATIN) 458036 UNIT/GM external cream Apply topically 2 times daily. For up to 14 days, then stop. 90 g 2    rosuvastatin (CRESTOR) 5 MG tablet Take 1 tablet (5 mg) by mouth daily. 90 tablet 3    VITAMIN D PO        Allergies   Allergen Reactions    Eggshell Membrane (Chicken) [Egg Shells]      Other reaction(s): Abdominal cramping    Latex Rash    Penicillin G Rash    Sulfa Antibiotics      Recent Labs   Lab Test 25  1411 24  1519 23  1110 23  1401 21  1113 21  1113 20  1122 19  0000 19  0800   A1C 6.0* 6.2*  --  5.9*   < > 6.1* 6.0*  --   --    LDL  --  94 95 167*   < > 135* 163*  --  143*   HDL  --  74 73 68   < > 70 69  --  65   TRIG  --  71 82 87   < > 72 104  --  111   ALT  --  16  --   --   --  22 20  --  18   CR  --  0.92  --  0.72   < > 0.68 0.70   < > 0.76   GFRESTIMATED  --  65  --  88   < > 88 87  --  79   GFRESTBLACK  --   --   --   --   --   --  >90  --  >90   POTASSIUM  --  4.3  " --  4.1  --  4.1 3.8  --  3.8   TSH  --  3.61  --   --   --   --   --   --   --     < > = values in this interval not displayed.      Current providers sharing in care for this patient include:  Patient Care Team:  Misti Astudillo MD as PCP - General (Family Medicine)  Misti Astudillo MD as Assigned PCP    The following health maintenance items are reviewed in Epic and correct as of today:  Health Maintenance   Topic Date Due    RSV VACCINE (1 - 1-dose 75+ series) Never done    BMP  02/28/2025    LIPID  02/28/2025    COLORECTAL CANCER SCREENING  03/14/2025    COVID-19 Vaccine (6 - Pfizer risk 2024-25 season) 10/08/2025    MEDICARE ANNUAL WELLNESS VISIT  04/08/2026    ANNUAL REVIEW OF HM ORDERS  04/08/2026    FALL RISK ASSESSMENT  04/08/2026    DEXA  04/19/2027    DTAP/TDAP/TD IMMUNIZATION (4 - Td or Tdap) 07/17/2027    DIABETES SCREENING  04/08/2028    ADVANCE CARE PLANNING  04/08/2030    HEPATITIS C SCREENING  Completed    PHQ-2 (once per calendar year)  Completed    INFLUENZA VACCINE  Completed    Pneumococcal Vaccine: 50+ Years  Completed    ZOSTER IMMUNIZATION  Completed    HPV IMMUNIZATION  Aged Out    MENINGITIS IMMUNIZATION  Aged Out    MAMMO SCREENING  Discontinued          Objective    Exam  LMP  (LMP Unknown)    Estimated body mass index is 44.8 kg/m  as calculated from the following:    Height as of 9/12/24: 1.626 m (5' 4\").    Weight as of 9/12/24: 118.4 kg (261 lb).    Physical Exam  GENERAL: alert and no distress  EYES: Eyes grossly normal to inspection, PERRL and conjunctivae and sclerae normal  HENT: ear canals and TM's normal, nose and mouth without ulcers or lesions  RESP: lungs clear to auscultation - no rales, rhonchi or wheezes  CV: regular rates and rhythm, normal S1 S2, no S3 or S4, no murmur, click or rub, and 1+ bilateral lower extremity pitting edema to ankles  SKIN: Erythematous patches in bilateral axillae, well-defined  PSYCH: mentation appears normal, affect " normal/bright        4/8/2025   Mini Cog   Clock Draw Score 2 Normal   3 Item Recall 3 objects recalled   Mini Cog Total Score 5        Signed Electronically by: Misti Williamson MD

## 2025-04-08 NOTE — PATIENT INSTRUCTIONS
Mesalamine needs to be refilled by RISA DAVID.    Consider RSV vaccine at the pharmacy.      Patient Education   Preventive Care Advice   This is general advice given by our system to help you stay healthy. However, your care team may have specific advice just for you. Please talk to your care team about your preventive care needs.  Nutrition  Eat 5 or more servings of fruits and vegetables each day.  Try wheat bread, brown rice and whole grain pasta (instead of white bread, rice, and pasta).  Get enough calcium and vitamin D. Check the label on foods and aim for 100% of the RDA (recommended daily allowance).  Lifestyle  Exercise at least 150 minutes each week  (30 minutes a day, 5 days a week).  Do muscle strengthening activities 2 days a week. These help control your weight and prevent disease.  No smoking.  Wear sunscreen to prevent skin cancer.  Have a dental exam and cleaning every 6 months.  Yearly exams  See your health care team every year to talk about:  Any changes in your health.  Any medicines your care team has prescribed.  Preventive care, family planning, and ways to prevent chronic diseases.  Shots (vaccines)   HPV shots (up to age 26), if you've never had them before.  Hepatitis B shots (up to age 59), if you've never had them before.  COVID-19 shot: Get this shot when it's due.  Flu shot: Get a flu shot every year.  Tetanus shot: Get a tetanus shot every 10 years.  Pneumococcal, hepatitis A, and RSV shots: Ask your care team if you need these based on your risk.  Shingles shot (for age 50 and up)  General health tests  Diabetes screening:  Starting at age 35, Get screened for diabetes at least every 3 years.  If you are younger than age 35, ask your care team if you should be screened for diabetes.  Cholesterol test: At age 39, start having a cholesterol test every 5 years, or more often if advised.  Bone density scan (DEXA): At age 50, ask your care team if you should have this scan for osteoporosis  (brittle bones).  Hepatitis C: Get tested at least once in your life.  STIs (sexually transmitted infections)  Before age 24: Ask your care team if you should be screened for STIs.  After age 24: Get screened for STIs if you're at risk. You are at risk for STIs (including HIV) if:  You are sexually active with more than one person.  You don't use condoms every time.  You or a partner was diagnosed with a sexually transmitted infection.  If you are at risk for HIV, ask about PrEP medicine to prevent HIV.  Get tested for HIV at least once in your life, whether you are at risk for HIV or not.  Cancer screening tests  Cervical cancer screening: If you have a cervix, begin getting regular cervical cancer screening tests starting at age 21.  Breast cancer scan (mammogram): If you've ever had breasts, begin having regular mammograms starting at age 40. This is a scan to check for breast cancer.  Colon cancer screening: It is important to start screening for colon cancer at age 45.  Have a colonoscopy test every 10 years (or more often if you're at risk) Or, ask your provider about stool tests like a FIT test every year or Cologuard test every 3 years.  To learn more about your testing options, visit:   .  For help making a decision, visit:   https://bit.ly/dv21606.  Prostate cancer screening test: If you have a prostate, ask your care team if a prostate cancer screening test (PSA) at age 55 is right for you.  Lung cancer screening: If you are a current or former smoker ages 50 to 80, ask your care team if ongoing lung cancer screenings are right for you.  For informational purposes only. Not to replace the advice of your health care provider. Copyright   2023 [employer]. All rights reserved. Clinically reviewed by the [facility]. [employer] [id number] - REV 01/24.  Preventing Falls: Care Instructions  Injuries and health problems such as trouble walking or poor eyesight can increase  your risk of falling. So can some medicines. But there are things you can do to help prevent falls. You can exercise to get stronger. You can also arrange your home to make it safer.    Talk to your doctor about the medicines you take. Ask if any of them increase the risk of falls and whether they can be changed or stopped.   Try to exercise regularly. It can help improve your strength and balance. This can help lower your risk of falling.         Practice fall safety and prevention.   Wear low-heeled shoes that fit well and give your feet good support. Talk to your doctor if you have foot problems that make this hard.  Carry a cellphone or wear a medical alert device that you can use to call for help.  Use stepladders instead of chairs to reach high objects. Don't climb if you're at risk for falls. Ask for help, if needed.  Wear the correct eyeglasses, if you need them.        Make your home safer.   Remove rugs, cords, clutter, and furniture from walkways.  Keep your house well lit. Use night-lights in hallways and bathrooms.  Install and use sturdy handrails on stairways.  Wear nonskid footwear, even inside. Don't walk barefoot or in socks without shoes.        Be safe outside.   Use handrails, curb cuts, and ramps whenever possible.  Keep your hands free by using a shoulder bag or backpack.  Try to walk in well-lit areas. Watch out for uneven ground, changes in pavement, and debris.  Be careful in the winter. Walk on the grass or gravel when sidewalks are slippery. Use de-icer on steps and walkways. Add non-slip devices to shoes.    Put grab bars and nonskid mats in your shower or tub and near the toilet. Try to use a shower chair or bath bench when bathing.   Get into a tub or shower by putting in your weaker leg first. Get out with your strong side first. Have a phone or medical alert device in the bathroom with you.   Where can you learn more?  Go to https://www.[employer].net/patiented  Enter G117 in the  "search box to learn more about \"Preventing Falls: Care Instructions.\"  Current as of: July 31, 2024  Content Version: 14.4    3682-6749 [employer].   Care instructions adapted under license by your healthcare professional. If you have questions about a medical condition or this instruction, always ask your healthcare professional. [employer] disclaims any warranty or liability for your use of this information.    Learning About Stress  What is stress?     Stress is your body's response to a hard situation. Your body can have a physical, emotional, or mental response. Stress is a fact of life for most people, and it affects everyone differently. What causes stress for you may not be stressful for someone else.  A lot of things can cause stress. You may feel stress when you go on a job interview, take a test, or run a race. This kind of short-term stress is normal and even useful. It can help you if you need to work hard or react quickly. For example, stress can help you finish an important job on time.  Long-term stress is caused by ongoing stressful situations or events. Examples of long-term stress include long-term health problems, ongoing problems at work, or conflicts in your family. Long-term stress can harm your health.  How does stress affect your health?  When you are stressed, your body responds as though you are in danger. It makes hormones that speed up your heart, make you breathe faster, and give you a burst of energy. This is called the fight-or-flight stress response. If the stress is over quickly, your body goes back to normal and no harm is done.  But if stress happens too often or lasts too long, it can have bad effects. Long-term stress can make you more likely to get sick, and it can make symptoms of some diseases worse. If you tense up when you are stressed, you may develop neck, shoulder, or low back pain. Stress is linked to high blood pressure and heart " disease.  Stress also harms your emotional health. It can make you alndry, tense, or depressed. Your relationships may suffer, and you may not do well at work or school.  What can you do to manage stress?  You can try these things to help manage stress:   Do something active. Exercise or activity can help reduce stress. Walking is a great way to get started. Even everyday activities such as housecleaning or yard work can help.  Try yoga or sofía chi. These techniques combine exercise and meditation. You may need some training at first to learn them.  Do something you enjoy. For example, listen to music or go to a movie. Practice your hobby or do volunteer work.  Meditate. This can help you relax, because you are not worrying about what happened before or what may happen in the future.  Do guided imagery. Imagine yourself in any setting that helps you feel calm. You can use online videos, books, or a teacher to guide you.  Do breathing exercises. For example:  From a standing position, bend forward from the waist with your knees slightly bent. Let your arms dangle close to the floor.  Breathe in slowly and deeply as you return to a standing position. Roll up slowly and lift your head last.  Hold your breath for just a few seconds in the standing position.  Breathe out slowly and bend forward from the waist.  Let your feelings out. Talk, laugh, cry, and express anger when you need to. Talking with supportive friends or family, a counselor, or a kala leader about your feelings is a healthy way to relieve stress. Avoid discussing your feelings with people who make you feel worse.  Write. It may help to write about things that are bothering you. This helps you find out how much stress you feel and what is causing it. When you know this, you can find better ways to cope.  What can you do to prevent stress?  You might try some of these things to help prevent stress:  Manage your time. This helps you find time to do the  "things you want and need to do.  Get enough sleep. Your body recovers from the stresses of the day while you are sleeping.  Get support. Your family, friends, and community can make a difference in how you experience stress.  Limit your news feed. Avoid or limit time on social media or news that may make you feel stressed.  Do something active. Exercise or activity can help reduce stress. Walking is a great way to get started.  Where can you learn more?  Go to https://www.[employer].net/patiented  Enter N032 in the search box to learn more about \"Learning About Stress.\"  Current as of: October 24, 2024  Content Version: 14.4    4286-3538 [employer].   Care instructions adapted under license by your healthcare professional. If you have questions about a medical condition or this instruction, always ask your healthcare professional. [employer] disclaims any warranty or liability for your use of this information.    Bladder Training: Care Instructions  Your Care Instructions     Bladder training is used to treat urge incontinence and stress incontinence. Urge incontinence means that the need to urinate comes on so fast that you can't get to a toilet in time. Stress incontinence means that you leak urine because of pressure on your bladder. For example, it may happen when you laugh, cough, or lift something heavy.  Bladder training can increase how long you can wait before you have to urinate. It can also help your bladder hold more urine. And it can give you better control over the urge to urinate.  It is important to remember that bladder training takes a few weeks to a few months to make a difference. You may not see results right away, but don't give up.  Follow-up care is a key part of your treatment and safety. Be sure to make and go to all appointments, and call your doctor if you are having problems. It's also a good idea to know your test results and keep a list of the medicines you " take.  How can you care for yourself at home?  Work with your doctor to come up with a bladder training program that is right for you. You may use one or more of the following methods.  Delayed urination  In the beginning, try to keep from urinating for 5 minutes after you first feel the need to go.  While you wait, take deep, slow breaths to relax. Kegel exercises can also help you delay the need to go to the bathroom.  After some practice, when you can easily wait 5 minutes to urinate, try to wait 10 minutes before you urinate.  Slowly increase the waiting period until you are able to control when you have to urinate.  Scheduled urination  Empty your bladder when you first wake up in the morning.  Schedule times throughout the day when you will urinate.  Start by going to the bathroom every hour, even if you don't need to go.  Slowly increase the time between trips to the bathroom.  When you have found a schedule that works well for you, keep doing it.  If you wake up during the night and have to urinate, do it. Apply your schedule to waking hours only.  Kegel exercises  These tighten and strengthen pelvic muscles, which can help you control the flow of urine. (If doing these exercises causes pain, stop doing them and talk with your doctor.) To do Kegel exercises:  Squeeze your muscles as if you were trying not to pass gas. Or squeeze your muscles as if you were stopping the flow of urine. Your belly, legs, and buttocks shouldn't move.  Hold the squeeze for 3 seconds, then relax for 5 to 10 seconds.  Start with 3 seconds, then add 1 second each week until you are able to squeeze for 10 seconds.  Repeat the exercise 10 times a session. Do 3 to 8 sessions a day.  When should you call for help?  Watch closely for changes in your health, and be sure to contact your doctor if:    Your incontinence is getting worse.     You do not get better as expected.   Where can you learn more?  Go to  "https://www.[employer].net/patiented  Enter V684 in the search box to learn more about \"Bladder Training: Care Instructions.\"  Current as of: April 30, 2024  Content Version: 14.4 2024-2025 [employer].   Care instructions adapted under license by your healthcare professional. If you have questions about a medical condition or this instruction, always ask your healthcare professional. [employer] disclaims any warranty or liability for your use of this information.       "

## 2025-04-08 NOTE — ASSESSMENT & PLAN NOTE
DNR/DNI.  Comfort measures only.  Declines artificial feeding tubes.  Declines IV or oral antibiotics.

## 2025-04-08 NOTE — Clinical Note
Please abstract the following data from this visit with this patient into the appropriate field in Epic:  Tests that can be patient reported without a hard copy:  Other Tests found in the patient's chart through Chart Review/Care Everywhere:  BMP or Creatinine done by this group [facility] on this date: 2024  Note to [employer]: If this section is blank, no results were found via Chart Review/Care Everywhere.

## 2025-04-15 ENCOUNTER — DOCUMENTATION ONLY (OUTPATIENT)
Dept: OTHER | Facility: CLINIC | Age: 75
End: 2025-04-15
Payer: COMMERCIAL